# Patient Record
Sex: FEMALE | URBAN - METROPOLITAN AREA
[De-identification: names, ages, dates, MRNs, and addresses within clinical notes are randomized per-mention and may not be internally consistent; named-entity substitution may affect disease eponyms.]

---

## 2019-12-06 ENCOUNTER — OFFICE VISIT (OUTPATIENT)
Dept: FAMILY MEDICINE CLINIC | Age: 39
End: 2019-12-06
Payer: MEDICAID

## 2019-12-06 VITALS
SYSTOLIC BLOOD PRESSURE: 134 MMHG | RESPIRATION RATE: 16 BRPM | HEART RATE: 86 BPM | HEIGHT: 65 IN | WEIGHT: 156 LBS | BODY MASS INDEX: 25.99 KG/M2 | DIASTOLIC BLOOD PRESSURE: 86 MMHG | TEMPERATURE: 97.5 F | OXYGEN SATURATION: 98 %

## 2019-12-06 DIAGNOSIS — G89.29 CHRONIC UPPER BACK PAIN: ICD-10-CM

## 2019-12-06 DIAGNOSIS — M54.9 CHRONIC UPPER BACK PAIN: ICD-10-CM

## 2019-12-06 DIAGNOSIS — Z00.00 WELL ADULT EXAM: Primary | ICD-10-CM

## 2019-12-06 PROCEDURE — G8482 FLU IMMUNIZE ORDER/ADMIN: HCPCS | Performed by: FAMILY MEDICINE

## 2019-12-06 PROCEDURE — 90715 TDAP VACCINE 7 YRS/> IM: CPT | Performed by: FAMILY MEDICINE

## 2019-12-06 PROCEDURE — 90686 IIV4 VACC NO PRSV 0.5 ML IM: CPT | Performed by: FAMILY MEDICINE

## 2019-12-06 PROCEDURE — 90471 IMMUNIZATION ADMIN: CPT | Performed by: FAMILY MEDICINE

## 2019-12-06 PROCEDURE — 99385 PREV VISIT NEW AGE 18-39: CPT | Performed by: FAMILY MEDICINE

## 2019-12-06 PROCEDURE — 90472 IMMUNIZATION ADMIN EACH ADD: CPT | Performed by: FAMILY MEDICINE

## 2019-12-06 RX ORDER — TIZANIDINE 2 MG/1
2 TABLET ORAL NIGHTLY PRN
Qty: 20 TABLET | Refills: 0 | Status: SHIPPED | OUTPATIENT
Start: 2019-12-06 | End: 2020-01-13

## 2019-12-06 ASSESSMENT — PATIENT HEALTH QUESTIONNAIRE - PHQ9
SUM OF ALL RESPONSES TO PHQ QUESTIONS 1-9: 0
SUM OF ALL RESPONSES TO PHQ QUESTIONS 1-9: 0
SUM OF ALL RESPONSES TO PHQ9 QUESTIONS 1 & 2: 0
2. FEELING DOWN, DEPRESSED OR HOPELESS: 0
1. LITTLE INTEREST OR PLEASURE IN DOING THINGS: 0

## 2019-12-06 ASSESSMENT — ENCOUNTER SYMPTOMS
SHORTNESS OF BREATH: 0
WHEEZING: 0
VOMITING: 0
ABDOMINAL PAIN: 0
RHINORRHEA: 0
CHEST TIGHTNESS: 0
EYE ITCHING: 0
NAUSEA: 0
EYE REDNESS: 0
BACK PAIN: 1
TROUBLE SWALLOWING: 0

## 2019-12-12 LAB
ANION GAP SERPL CALCULATED.3IONS-SCNC: 12 MMOL/L (ref 3–16)
BASOPHILS ABSOLUTE: 0 K/UL (ref 0–0.2)
BASOPHILS RELATIVE PERCENT: 0.7 %
BUN BLDV-MCNC: 7 MG/DL (ref 7–20)
CALCIUM SERPL-MCNC: 10.8 MG/DL (ref 8.3–10.6)
CHLORIDE BLD-SCNC: 100 MMOL/L (ref 99–110)
CHOLESTEROL, TOTAL: 164 MG/DL (ref 0–199)
CO2: 24 MMOL/L (ref 21–32)
CREAT SERPL-MCNC: 0.6 MG/DL (ref 0.6–1.1)
EOSINOPHILS ABSOLUTE: 0 K/UL (ref 0–0.6)
EOSINOPHILS RELATIVE PERCENT: 1 %
GFR AFRICAN AMERICAN: >60
GFR NON-AFRICAN AMERICAN: >60
GLUCOSE BLD-MCNC: 88 MG/DL (ref 70–99)
HCT VFR BLD CALC: 30.7 % (ref 36–48)
HDLC SERPL-MCNC: 84 MG/DL (ref 40–60)
HEMOGLOBIN: 9.5 G/DL (ref 12–16)
LDL CHOLESTEROL CALCULATED: 72 MG/DL
LYMPHOCYTES ABSOLUTE: 1.4 K/UL (ref 1–5.1)
LYMPHOCYTES RELATIVE PERCENT: 35.7 %
MCH RBC QN AUTO: 24.1 PG (ref 26–34)
MCHC RBC AUTO-ENTMCNC: 31 G/DL (ref 31–36)
MCV RBC AUTO: 77.8 FL (ref 80–100)
MONOCYTES ABSOLUTE: 0.3 K/UL (ref 0–1.3)
MONOCYTES RELATIVE PERCENT: 7.2 %
NEUTROPHILS ABSOLUTE: 2.2 K/UL (ref 1.7–7.7)
NEUTROPHILS RELATIVE PERCENT: 55.4 %
PDW BLD-RTO: 16 % (ref 12.4–15.4)
PLATELET # BLD: 310 K/UL (ref 135–450)
PMV BLD AUTO: 8.8 FL (ref 5–10.5)
POTASSIUM SERPL-SCNC: 4.1 MMOL/L (ref 3.5–5.1)
RBC # BLD: 3.95 M/UL (ref 4–5.2)
SODIUM BLD-SCNC: 136 MMOL/L (ref 136–145)
TRIGL SERPL-MCNC: 42 MG/DL (ref 0–150)
VLDLC SERPL CALC-MCNC: 8 MG/DL
WBC # BLD: 4.1 K/UL (ref 4–11)

## 2019-12-13 LAB
HIV AG/AB: NORMAL
HIV ANTIGEN: NORMAL
HIV-1 ANTIBODY: NORMAL
HIV-2 AB: NORMAL

## 2020-01-10 ENCOUNTER — OFFICE VISIT (OUTPATIENT)
Dept: FAMILY MEDICINE CLINIC | Age: 40
End: 2020-01-10
Payer: MEDICAID

## 2020-01-10 VITALS
OXYGEN SATURATION: 100 % | RESPIRATION RATE: 16 BRPM | BODY MASS INDEX: 24.86 KG/M2 | DIASTOLIC BLOOD PRESSURE: 84 MMHG | TEMPERATURE: 97.6 F | WEIGHT: 154.7 LBS | HEART RATE: 79 BPM | HEIGHT: 66 IN | SYSTOLIC BLOOD PRESSURE: 122 MMHG

## 2020-01-10 PROCEDURE — G8482 FLU IMMUNIZE ORDER/ADMIN: HCPCS | Performed by: FAMILY MEDICINE

## 2020-01-10 PROCEDURE — 99395 PREV VISIT EST AGE 18-39: CPT | Performed by: FAMILY MEDICINE

## 2020-01-10 ASSESSMENT — ENCOUNTER SYMPTOMS
SORE THROAT: 0
DIARRHEA: 0
ABDOMINAL PAIN: 0
BACK PAIN: 0
NAUSEA: 0
VOMITING: 0
CONSTIPATION: 0

## 2020-01-10 ASSESSMENT — PATIENT HEALTH QUESTIONNAIRE - PHQ9
SUM OF ALL RESPONSES TO PHQ9 QUESTIONS 1 & 2: 0
2. FEELING DOWN, DEPRESSED OR HOPELESS: 0
SUM OF ALL RESPONSES TO PHQ QUESTIONS 1-9: 0
1. LITTLE INTEREST OR PLEASURE IN DOING THINGS: 0
SUM OF ALL RESPONSES TO PHQ QUESTIONS 1-9: 0

## 2020-01-10 NOTE — PROGRESS NOTES
Subjective:      Patient ID: Cale Greenwood is a 44 y.o. female. Gynecologic Exam   Primary symptoms comment: here for pap. The patient is experiencing no pain. Pertinent negatives include no abdominal pain, anorexia, back pain, chills, constipation, diarrhea, discolored urine, dysuria, fever, flank pain, frequency, headaches, hematuria, joint pain, joint swelling, nausea, painful intercourse, rash, sore throat, urgency or vomiting. She is sexually active. She uses nothing for contraception. There is no history of an STD or vaginosis. Review of Systems   Constitutional: Negative for chills and fever. HENT: Negative for sore throat. Gastrointestinal: Negative for abdominal pain, anorexia, constipation, diarrhea, nausea and vomiting. Genitourinary: Negative for dysuria, flank pain, frequency, hematuria and urgency. Musculoskeletal: Negative for back pain and joint pain. Skin: Negative for rash. Neurological: Negative for weakness and headaches. Psychiatric/Behavioral: Negative for sleep disturbance. Objective:  Blood pressure 122/84, pulse 79, temperature 97.6 °F (36.4 °C), temperature source Tympanic, resp. rate 16, height 5' 5.5\" (1.664 m), weight 154 lb 11.2 oz (70.2 kg), last menstrual period 12/31/2019, SpO2 100 %, not currently breastfeeding. Physical Exam  Exam conducted with a chaperone present. Constitutional:       General: She is not in acute distress. Appearance: She is well-developed and normal weight. She is not diaphoretic. HENT:      Head: Normocephalic and atraumatic. Right Ear: External ear normal.      Left Ear: External ear normal.      Nose: Nose normal.      Mouth/Throat:      Pharynx: No oropharyngeal exudate. Eyes:      General: No scleral icterus. Right eye: No discharge. Left eye: No discharge. Conjunctiva/sclera: Conjunctivae normal.      Pupils: Pupils are equal, round, and reactive to light.    Neck: rash.   Neurological:      Cranial Nerves: No cranial nerve deficit. Motor: No abnormal muscle tone. Coordination: Coordination normal.      Deep Tendon Reflexes: Reflexes normal.   Psychiatric:         Behavior: Behavior normal.         Thought Content: Thought content normal.         Judgment: Judgment normal.         Assessment:       Diagnosis Orders   1. Encounter for gynecological examination without abnormal finding  PAP SMEAR   2. Screening for breast cancer  GIGI DIGITAL SCREEN W CAD BILATERAL   3.  Chronic upper back pain  [facility] Physical [facility]           Plan:      Pap smear  Vaccines utd  Mammogram    Referral re printed    Fu prn     encourage contraception declines   Start PNV  patient agrees and verbalizes understanding    Fu prn        Mayte Alcantara MD

## 2020-01-13 LAB
HPV COMMENT: NORMAL
HPV TYPE 16: NOT DETECTED
HPV TYPE 18: NOT DETECTED
HPVOH (OTHER TYPES): NOT DETECTED

## 2020-01-13 RX ORDER — TIZANIDINE 2 MG/1
2 TABLET ORAL NIGHTLY PRN
Qty: 20 TABLET | Refills: 0 | Status: SHIPPED | OUTPATIENT
Start: 2020-01-13 | End: 2020-02-02

## 2020-01-28 ENCOUNTER — HOSPITAL ENCOUNTER (OUTPATIENT)
Dept: PHYSICAL THERAPY | Age: 40
Setting detail: THERAPIES SERIES
Discharge: HOME | End: 2020-01-28
Payer: MEDICAID

## 2020-01-28 PROCEDURE — 97110 THERAPEUTIC EXERCISES: CPT

## 2020-01-28 PROCEDURE — 97161 PT EVAL LOW COMPLEX 20 MIN: CPT
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
## 2020-01-28 NOTE — FLOWSHEET NOTE
Re-ed ([id number])                                                 Manual Intervention (08591)                                                     Pt. Education:  1/28/20 Pt was educated on PT POC, Diagnosis, Prognosis, pathomechanics as well as frequency and duration of scheduling future physical therapy appointments. Time was also taken on this day to answer all patient questions and participation in PT. Extensive discussion with pt on postural control and involvement with pain. Discussed goals of PT to improve postural control with flexibility of cervical spine as well as pec musculature and increased control and strength for interscapular musculature. HEP instruction:  1/28/20 Patient was educated on home exercise program including distrubution of handout describing exercises, sets, repetitions, frequency and intensity. Exercises/activities include: Upper trap stretch, doorway pec stretch, scap squeeze, mid row with lime band    Therapeutic Exercise and NMR EXR  [x] (17028) Provided verbal/tactile cueing for activities related to strengthening, flexibility, endurance, ROM for improvements in  [] LE / Lumbar: LE, proximal hip, and core control with self care, mobility, lifting, ambulation. [x] UE / Cervical: cervical, postural, scapular, scapulothoracic and UE control with self care, reaching, carrying, lifting, house/yardwork, driving, computer work.  [] (86424) Provided verbal/tactile cueing for activities related to improving balance, coordination, kinesthetic sense, posture, motor skill, proprioception to assist with   [] LE / lumbar: LE, proximal hip, and core control in self care, mobility, lifting, ambulation and eccentric single leg control.    [] UE / cervical: cervical, scapular, scapulothoracic and UE control with self care, reaching, carrying, lifting, house/yardwork, driving, computer work.   [] (13841) Therapist is in constant attendance of 2 or more patients providing skilled therapy interventions, but not providing any significant amount of measurable one-on-one time to either patient, for improvements in  [] LE / lumbar: LE, proximal hip, and core control in self care, mobility, lifting, ambulation and eccentric single leg control. [] UE / cervical: cervical, scapular, scapulothoracic and UE control with self care, reaching, carrying, lifting, house/yardwork, driving, computer work. NMR and Therapeutic Activities:    [x] (67481 or 64749) Provided verbal/tactile cueing for activities related to improving balance, coordination, kinesthetic sense, posture, motor skill, proprioception and motor activation to allow for proper function of   [] LE: / Lumbar core, proximal hip and LE with self care and ADLs  [x] UE / Cervical: cervical, postural, scapular, scapulothoracic and UE control with self care, carrying, lifting, driving, computer work.   [] (97827) Gait Re-education- Provided training and instruction to the patient for proper LE, core and proximal hip recruitment and positioning and eccentric body weight control with ambulation re-education including up and down stairs     Home Management Training / Self Care:  [] (65504) Provided self-care/home management training related to activities of daily living and compensatory training, and/or use of adaptive equipment for improvement with: ADLs and compensatory training, meal preparation, safety procedures and instruction in use of adaptive equipment, including bathing, grooming, dressing, personal hygiene, basic household cleaning and chores.      Home Exercise Program:    [x] (74650) Reviewed/Progressed HEP activities related to strengthening, flexibility, endurance, ROM of   [] LE / Lumbar: core, proximal hip and LE for functional self-care, mobility, lifting and ambulation/stair navigation   [x] UE / Cervical: cervical, postural, scapular, scapulothoracic and UE control with self care, reaching, carrying, lifting, house/yardwork, driving, computer her children   [] Progressing: [] Met: [] Not Met: [] Adjusted    Therapist goals for Patient:   Short Term Goals: To be achieved in: 2 weeks  1. Independent in HEP and progression per patient tolerance, in order to prevent re-injury. [] Progressing: [] Met: [] Not Met: [] Adjusted  2. Patient will have a decrease in pain to facilitate improvement in movement, function, and ADLs as indicated by Functional Deficits. [] Progressing: [] Met: [] Not Met: [] Adjusted    Long Term Goals: To be achieved in: 6 weeks  1. Disability index score of 8% or less for the NDI to assist with reaching prior level of function. [] Progressing: [] Met: [] Not Met: [] Adjusted  2. Patient will demonstrate increased AROM to [facility] of cervical/thoracic spine to allow for proper joint functioning as indicated by patients Functional Deficits. [] Progressing: [] Met: [] Not Met: [] Adjusted  3. Patient will demonstrate an increase in postural awareness and control and activation of  Deep cervical stabilizers to allow for proper functional mobility as indicated by patients Functional Deficits. [] Progressing: [] Met: [] Not Met: [] Adjusted  4. Patient will return to functional activities including ADL's and IADL's for longer than 1 hour without increased symptoms or restriction. [] Progressing: [] Met: [] Not Met: [] Adjusted  5. Patient will be able to returning to caring for her children without issues or limitations. [] Progressing: [] Met: [] Not Met: [] Adjusted    Overall Progression Towards Functional goals/ Treatment Progress Update:  [] Patient is progressing as expected towards functional goals listed. [] Progression is slowed due to complexities/Impairments listed. [] Progression has been slowed due to co-morbidities.   [x] Plan just implemented, too soon to assess goals progression <30days   [] Goals require adjustment due to lack of progress  [] Patient is not progressing as expected and requires additional follow up with physician  [] Other    Persisting Functional Limitations/Impairments:  []Sleeping []Sitting               []Standing []Transfers        []Walking []Kneeling               []Stairs []Squatting / bending   [x]ADLs [x]Reaching  [x]Lifting  [x]Housework  [x]Driving []Job related tasks  []Sports/Recreation []Other:        ASSESSMENT:  See eval    Treatment/Activity Tolerance:  [x] Patient able to complete tx  [] Patient limited by fatigue  [] Patient limited by pain  [] Patient limited by other medical complications  [] Other:     Prognosis: [x] Good [] Fair  [] Poor    Patient Requires Follow-up: [x] Yes  [] No    Plan for next treatment session: Postural stability emphasis, work on interscapular engagement emphasis, decreased UT recruitment. PLAN: See eval. PT 2x / week for 4-6 weeks. [] Continue per plan of care [] Alter current plan (see comments)  [x] Plan of care initiated [] Hold pending MD visit [] Discharge    Electronically signed by: Donald Harley PT, DPT    Note: If patient does not return for scheduled/ recommended follow up visits, his note will serve as a discharge from care along with most recent update on progress.

## 2020-02-05 ENCOUNTER — HOSPITAL ENCOUNTER (OUTPATIENT)
Dept: PHYSICAL THERAPY | Age: 40
Setting detail: THERAPIES SERIES
Discharge: HOME | End: 2020-02-05
Payer: MEDICAID

## 2020-02-05 PROCEDURE — 97110 THERAPEUTIC EXERCISES: CPT

## 2020-02-05 PROCEDURE — 97140 MANUAL THERAPY 1/> REGIONS: CPT

## 2020-02-05 NOTE — FLOWSHEET NOTE
[facility] Physical Therapy  Phone: [phone]   Fax: [phone]    Physical Therapy Daily Treatment Note  Date:  2020    Patient Name:  Amena Quesada    :  1980  MRN: [id number]  Medical/Treatment Diagnosis Information:  Diagnosis: Upper back pain  Treatment Diagnosis: Decreased interscapular muscular strength, postural control and neuromuscular stability contributing to increased stress/tension throughout cervical and scapular musculature causing increase in pain with prolonged activity with ADL's, IADL's and recreationally. Insurance/Certification information:  PT Insurance Information: [facility] (Eval + 12 FUA and then precertification needed)  Physician Information:  Referring Practitioner: Faina Ashley MD  Plan of care signed (Y/N): []  Yes [x]  No     Date of Patient follow up with Physician: n/a     Progress Report: []  Yes  [x]  No     Date Range for reporting period:  Beginning   Ending-    Progress report due (10 Rx/or 30 days whichever is less):       Recertification due (POC duration/ or 90 days whichever is less): 3/10     Visit # Insurance Allowable Auth required? Date Range   2 13 [x]  Yes (After 13 visits)  []  No -     Latex Allergy:  [x]NO      []YES  Preferred Language for Healthcare:   [x]English       []other:    Functional Scale: NDI: 18%  Date assessed:    Pain level:  3/10     SUBJECTIVE:  Patient states her pain is about the same.   She has not tried any of her HEP when asked by PT.  OBJECTIVE: See eval      RESTRICTIONS/PRECAUTIONS: n/a    Exercises/Interventions:     Therapeutic Exercises (20889) Resistance / level Sets/sec Reps Notes   Upper trap stretch  20\" 2 R/L HEP   Doorway pec stretch  20\" 2 HEP   scap squeeze  2-3\" hold 10 HEP; MAX cueing needed for appropriate and consistent muscle activation   Mid row with band lime 1 10 HEP: cues for correct scapular engagement    Prone  Prone B GH ext 0#     Prone B Rows core control in self care, mobility, lifting, ambulation and eccentric single leg control. [] UE / cervical: cervical, scapular, scapulothoracic and UE control with self care, reaching, carrying, lifting, house/yardwork, driving, computer work.   [] (95538) Therapist is in constant attendance of 2 or more patients providing skilled therapy interventions, but not providing any significant amount of measurable one-on-one time to either patient, for improvements in  [] LE / lumbar: LE, proximal hip, and core control in self care, mobility, lifting, ambulation and eccentric single leg control. [] UE / cervical: cervical, scapular, scapulothoracic and UE control with self care, reaching, carrying, lifting, house/yardwork, driving, computer work. NMR and Therapeutic Activities:    [x] (21411 or 68953) Provided verbal/tactile cueing for activities related to improving balance, coordination, kinesthetic sense, posture, motor skill, proprioception and motor activation to allow for proper function of   [] LE: / Lumbar core, proximal hip and LE with self care and ADLs  [x] UE / Cervical: cervical, postural, scapular, scapulothoracic and UE control with self care, carrying, lifting, driving, computer work.   [] (03716) Gait Re-education- Provided training and instruction to the patient for proper LE, core and proximal hip recruitment and positioning and eccentric body weight control with ambulation re-education including up and down stairs     Home Management Training / Self Care:  [] (59224) Provided self-care/home management training related to activities of daily living and compensatory training, and/or use of adaptive equipment for improvement with: ADLs and compensatory training, meal preparation, safety procedures and instruction in use of adaptive equipment, including bathing, grooming, dressing, personal hygiene, basic household cleaning and chores.      Home Exercise Program:    [x] (33111) Reviewed/Progressed towards functional goals listed. [] Progression is slowed due to complexities/Impairments listed. [] Progression has been slowed due to co-morbidities. [x] Plan just implemented, too soon to assess goals progression <30days   [] Goals require adjustment due to lack of progress  [] Patient is not progressing as expected and requires additional follow up with physician  [] Other    Persisting Functional Limitations/Impairments:  []Sleeping []Sitting               []Standing []Transfers        []Walking []Kneeling               []Stairs []Squatting / bending   [x]ADLs [x]Reaching  [x]Lifting  [x]Housework  [x]Driving []Job related tasks  []Sports/Recreation []Other:        ASSESSMENT:  Patient with low tolerance for manual PT for stiffness in thoracic spine. Patient has significant decreased postural awareness for upright upper quarter posture and neutral head position and required nearly constant tactile and verbal cues to correct. Continue to add in scapular and upper quarter postural exercises to progress patient. Treatment/Activity Tolerance:  [x] Patient able to complete tx  [] Patient limited by fatigue  [] Patient limited by pain  [] Patient limited by other medical complications  [] Other:     Prognosis: [x] Good [] Fair  [] Poor    Patient Requires Follow-up: [x] Yes  [] No    Plan for next treatment session: Postural stability emphasis, work on interscapular engagement emphasis, decreased UT recruitment. PLAN: See eval. PT 2x / week for 4-6 weeks. [] Continue per plan of care [] Alter current plan (see comments)  [] Plan of care initiated [] Hold pending MD visit [] Discharge    Electronically signed by: Chadwick Edwards PT, MSPT    Note: If patient does not return for scheduled/ recommended follow up visits, his note will serve as a discharge from care along with most recent update on progress.

## 2020-02-07 ENCOUNTER — HOSPITAL ENCOUNTER (OUTPATIENT)
Dept: PHYSICAL THERAPY | Age: 40
Setting detail: THERAPIES SERIES
Discharge: HOME | End: 2020-02-07
Payer: MEDICAID

## 2020-02-07 PROCEDURE — 97140 MANUAL THERAPY 1/> REGIONS: CPT

## 2020-02-07 PROCEDURE — 97110 THERAPEUTIC EXERCISES: CPT

## 2020-02-07 PROCEDURE — 97112 NEUROMUSCULAR REEDUCATION: CPT

## 2020-02-07 NOTE — FLOWSHEET NOTE
roll  1 10 Added 2/7 cues for technique and to keep arms in plane of body   Hook lying thoracic ext mobs with towel roll perpendicular to spine  2 5 Added 2/7 1 set upper thoracic and 1 set mid thoracic spine          Therapeutic Activities (01191)                                          Neuromuscular Re-ed (77646)       wALL Cjin tucks     Wall with Chin tucks with:    B shoulder ext with band and scap squeeze Blue 2 10 Added 2/7 cues for scapular position and technique   B shoulder ER with scap squeeze  Lime  2 10 Added 2/7 cues for scapular activation                  Manual Intervention (71900)       PA mobs B TP at T5-3, R/L TP at T3-C6, inferior glides of first rib emphasis on R Grade 2-3  4 min    Cervical distraction  20\" 4    Upper trap stretching with scapular fixation  20\" 2 R/L                             Pt. Education:  1/28/20 Pt was educated on PT POC, Diagnosis, Prognosis, pathomechanics as well as frequency and duration of scheduling future physical therapy appointments. Time was also taken on this day to answer all patient questions and participation in PT. Extensive discussion with pt on postural control and involvement with pain. Discussed goals of PT to improve postural control with flexibility of cervical spine as well as pec musculature and increased control and strength for interscapular musculature. HEP instruction:  1/28/20 Patient was educated on home exercise program including distrubution of handout describing exercises, sets, repetitions, frequency and intensity. Exercises/activities include: Upper trap stretch, doorway pec stretch, scap squeeze, mid row with lime band    Therapeutic Exercise and NMR EXR  [x] (27363) Provided verbal/tactile cueing for activities related to strengthening, flexibility, endurance, ROM for improvements in  [] LE / Lumbar: LE, proximal hip, and core control with self care, mobility, lifting, ambulation.   [x] UE / Cervical: cervical, postural, scapular, training, and/or use of adaptive equipment for improvement with: ADLs and compensatory training, meal preparation, safety procedures and instruction in use of adaptive equipment, including bathing, grooming, dressing, personal hygiene, basic household cleaning and chores. Home Exercise Program:    [] (03552) Reviewed/Progressed HEP activities related to strengthening, flexibility, endurance, ROM of   [] LE / Lumbar: core, proximal hip and LE for functional self-care, mobility, lifting and ambulation/stair navigation   [] UE / Cervical: cervical, postural, scapular, scapulothoracic and UE control with self care, reaching, carrying, lifting, house/yardwork, driving, computer work  [] (40429)Reviewed/Progressed HEP activities related to improving balance, coordination, kinesthetic sense, posture, motor skill, proprioception of   [] LE: core, proximal hip and LE for self care, mobility, lifting, and ambulation/stair navigation    [] UE / Cervical: cervical, postural,  scapular, scapulothoracic and UE control with self care, reaching, carrying, lifting, house/yardwork, driving, computer work    Manual Treatments:  PROM / STM / Oscillations-Mobs:  G-I, II, III, IV (PA's, Inf., Post.)  [] (54029) Provided manual therapy to mobilize LE, proximal hip and/or LS spine soft tissue/joints for the purpose of modulating pain, promoting relaxation,  increasing ROM, reducing/eliminating soft tissue swelling/inflammation/restriction, improving soft tissue extensibility and allowing for proper ROM for normal function with   [] LE / lumbar: self care, mobility, lifting and ambulation. [] UE / Cervical: self care, reaching, carrying, lifting, house/yardwork, driving, computer work.      Modalities:  [] (94593) Vasopneumatic compression: Utilized vasopneumatic compression to decrease edema / swelling for the purpose of improving mobility and quad tone / recruitment which will allow for increased overall function including but not limited to self-care, transfers, ambulation, and ascending / descending stairs. Modalities:      Charges:  Timed Code Treatment Minutes: 41   Total Treatment Minutes: 41     [] EVAL - LOW (74961)   [] EVAL - MOD (03748)  [] EVAL - HIGH (60435)  [] RE-EVAL (54350)  [x] BN(99063) x 1    [] Ionto  [] NMR (71013) x       [] Vaso  [x] Manual (58147) x1     [] Ultrasound  [] TA x        [] Mech Traction (85311)  [] Aquatic Therapy x     [] ES (un) (51356):   [] Home Management Training x  [] ES(attended) (37944)   [] Dry Needling 1-2 muscles (33177):  [] Dry Needling 3+ muscles (686549  [] Group:      [] Other:     GOALS:  Patient stated goal: decrease pain with caring for her children   [] Progressing: [] Met: [] Not Met: [] Adjusted    Therapist goals for Patient:   Short Term Goals: To be achieved in: 2 weeks  1. Independent in HEP and progression per patient tolerance, in order to prevent re-injury. [] Progressing: [] Met: [] Not Met: [] Adjusted  2. Patient will have a decrease in pain to facilitate improvement in movement, function, and ADLs as indicated by Functional Deficits. [] Progressing: [] Met: [] Not Met: [] Adjusted    Long Term Goals: To be achieved in: 6 weeks  1. Disability index score of 8% or less for the NDI to assist with reaching prior level of function. [] Progressing: [] Met: [] Not Met: [] Adjusted  2. Patient will demonstrate increased AROM to [facility] of cervical/thoracic spine to allow for proper joint functioning as indicated by patients Functional Deficits. [] Progressing: [] Met: [] Not Met: [] Adjusted  3. Patient will demonstrate an increase in postural awareness and control and activation of  Deep cervical stabilizers to allow for proper functional mobility as indicated by patients Functional Deficits. [] Progressing: [] Met: [] Not Met: [] Adjusted  4.  Patient will return to functional activities including ADL's and IADL's for longer than 1 hour without increased symptoms or (see comments)  [] Plan of care initiated [] Hold pending MD visit [] Discharge    Electronically signed by: Jose Miguel Sommer PT    Note: If patient does not return for scheduled/ recommended follow up visits, his note will serve as a discharge from care along with most recent update on progress.

## 2020-02-10 ENCOUNTER — APPOINTMENT (OUTPATIENT)
Dept: PHYSICAL THERAPY | Age: 40
End: 2020-02-10
Payer: MEDICAID

## 2020-02-14 ENCOUNTER — HOSPITAL ENCOUNTER (OUTPATIENT)
Dept: PHYSICAL THERAPY | Age: 40
Setting detail: THERAPIES SERIES
Discharge: HOME | End: 2020-02-14
Payer: MEDICAID

## 2020-02-14 PROCEDURE — 97110 THERAPEUTIC EXERCISES: CPT

## 2020-02-14 PROCEDURE — 97112 NEUROMUSCULAR REEDUCATION: CPT

## 2020-02-14 NOTE — FLOWSHEET NOTE
Lime    Blue 1    2 10    10 HEP: cues for correct scapular engagement; ^2/7 resistance/sets   Prone  Added 2/5   UBE  60RPM  3 min    [facility] with double towel roll  1 10 Added 2/7 cues for technique and to keep arms in plane of body   Hook lying thoracic ext mobs with towel roll perpendicular to spine  2 5 Added 2/7 1 set upper thoracic and 1 set mid thoracic spine          Therapeutic Activities (68317)                                          Neuromuscular Re-ed (01879)       Chin Tucks at wall with towel  5\"10    Wall with Chin tucks with: B GH Flex to 90 degrees 0# 1     10      B shoulder ext with band and scap squeeze Blue 2 10 Added 2/7 cues for scapular position and technique   B shoulder ER with scap squeeze  Lime  2 10 Added 2/7 cues for scapular activation    Touch downs  1 10 Added 2/14 cues for technique for head positioning. Manual Intervention ([id number])       PA mobs B TP at T5-3, R/L TP at T3-C6, inferior glides of first rib emphasis on R    Cervical distraction    Upper trap stretching with scapular fixation                             Pt. Education:  1/28/20 Pt was educated on PT POC, Diagnosis, Prognosis, pathomechanics as well as frequency and duration of scheduling future physical therapy appointments. Time was also taken on this day to answer all patient questions and participation in PT. Extensive discussion with pt on postural control and involvement with pain. Discussed goals of PT to improve postural control with flexibility of cervical spine as well as pec musculature and increased control and strength for interscapular musculature. HEP instruction:  1/28/20 Patient was educated on home exercise program including distrubution of handout describing exercises, sets, repetitions, frequency and intensity.  Exercises/activities include: Upper trap stretch, doorway pec stretch, scap squeeze, mid row with lime band    Therapeutic Exercise and NMR EXR  [x] (05221) Provided positioning and eccentric body weight control with ambulation re-education including up and down stairs     Home Management Training / Self Care:  [] (88621) Provided self-care/home management training related to activities of daily living and compensatory training, and/or use of adaptive equipment for improvement with: ADLs and compensatory training, meal preparation, safety procedures and instruction in use of adaptive equipment, including bathing, grooming, dressing, personal hygiene, basic household cleaning and chores. Home Exercise Program:    [] (05031) Reviewed/Progressed HEP activities related to strengthening, flexibility, endurance, ROM of   [] LE / Lumbar: core, proximal hip and LE for functional self-care, mobility, lifting and ambulation/stair navigation   [] UE / Cervical: cervical, postural, scapular, scapulothoracic and UE control with self care, reaching, carrying, lifting, house/yardwork, driving, computer work  [] (16217)Reviewed/Progressed HEP activities related to improving balance, coordination, kinesthetic sense, posture, motor skill, proprioception of   [] LE: core, proximal hip and LE for self care, mobility, lifting, and ambulation/stair navigation    [] UE / Cervical: cervical, postural,  scapular, scapulothoracic and UE control with self care, reaching, carrying, lifting, house/yardwork, driving, computer work    Manual Treatments:  PROM / STM / Oscillations-Mobs:  G-I, II, III, IV (PA's, Inf., Post.)  [] (82277) Provided manual therapy to mobilize LE, proximal hip and/or LS spine soft tissue/joints for the purpose of modulating pain, promoting relaxation,  increasing ROM, reducing/eliminating soft tissue swelling/inflammation/restriction, improving soft tissue extensibility and allowing for proper ROM for normal function with   [] LE / lumbar: self care, mobility, lifting and ambulation.     [] UE / Cervical: self care, reaching, carrying, lifting, house/yardwork, driving, computer control and activation of  Deep cervical stabilizers to allow for proper functional mobility as indicated by patients Functional Deficits. [] Progressing: [] Met: [] Not Met: [] Adjusted  4. Patient will return to functional activities including ADL's and IADL's for longer than 1 hour without increased symptoms or restriction. [] Progressing: [] Met: [] Not Met: [] Adjusted  5. Patient will be able to returning to caring for her children without issues or limitations. [] Progressing: [] Met: [] Not Met: [] Adjusted    Overall Progression Towards Functional goals/ Treatment Progress Update:  [x] Patient is progressing as expected towards functional goals listed. [] Progression is slowed due to complexities/Impairments listed. [] Progression has been slowed due to co-morbidities. [] Plan just implemented, too soon to assess goals progression <30days   [] Goals require adjustment due to lack of progress  [] Patient is not progressing as expected and requires additional follow up with physician  [] Other    Persisting Functional Limitations/Impairments:  []Sleeping []Sitting               []Standing []Transfers        []Walking []Kneeling               []Stairs []Squatting / bending   [x]ADLs [x]Reaching  [x]Lifting  [x]Housework  [x]Driving []Job related tasks  []Sports/Recreation []Other:        ASSESSMENT:  Pt overall doing better, progressing with decreased reports of pain and frequency of pain. Very low tolerance to therex today limiting progressions with session. Pt reports of increase in fatigue and soreness with interventions, decreased with stretching and MHP after. Will benefit from continuation of PT to work on progressing postural awareness and control as able.      Treatment/Activity Tolerance:  [x] Patient able to complete tx  [] Patient limited by fatigue  [] Patient limited by pain  [] Patient limited by other medical complications  [] Other:     Prognosis: [x] Good [] Fair  []

## 2020-02-17 ENCOUNTER — HOSPITAL ENCOUNTER (OUTPATIENT)
Dept: PHYSICAL THERAPY | Age: 40
Setting detail: THERAPIES SERIES
Discharge: HOME | End: 2020-02-17
Payer: MEDICAID

## 2020-02-17 PROCEDURE — 97112 NEUROMUSCULAR REEDUCATION: CPT

## 2020-02-17 PROCEDURE — 97110 THERAPEUTIC EXERCISES: CPT

## 2020-02-17 NOTE — FLOWSHEET NOTE
work.   [] (21711) Gait Re-education- Provided training and instruction to the patient for proper LE, core and proximal hip recruitment and positioning and eccentric body weight control with ambulation re-education including up and down stairs     Home Management Training / Self Care:  [] (53503) Provided self-care/home management training related to activities of daily living and compensatory training, and/or use of adaptive equipment for improvement with: ADLs and compensatory training, meal preparation, safety procedures and instruction in use of adaptive equipment, including bathing, grooming, dressing, personal hygiene, basic household cleaning and chores.      Home Exercise Program:    [] (37734) Reviewed/Progressed HEP activities related to strengthening, flexibility, endurance, ROM of   [] LE / Lumbar: core, proximal hip and LE for functional self-care, mobility, lifting and ambulation/stair navigation   [] UE / Cervical: cervical, postural, scapular, scapulothoracic and UE control with self care, reaching, carrying, lifting, house/yardwork, driving, computer work  [] (71488)Reviewed/Progressed HEP activities related to improving balance, coordination, kinesthetic sense, posture, motor skill, proprioception of   [] LE: core, proximal hip and LE for self care, mobility, lifting, and ambulation/stair navigation    [] UE / Cervical: cervical, postural,  scapular, scapulothoracic and UE control with self care, reaching, carrying, lifting, house/yardwork, driving, computer work    Manual Treatments:  PROM / STM / Oscillations-Mobs:  G-I, II, III, IV (PA's, Inf., Post.)  [] (02836) Provided manual therapy to mobilize LE, proximal hip and/or LS spine soft tissue/joints for the purpose of modulating pain, promoting relaxation,  increasing ROM, reducing/eliminating soft tissue swelling/inflammation/restriction, improving soft tissue extensibility and allowing for proper ROM for normal function with   [] LE / lumbar: patients Functional Deficits. [] Progressing: [] Met: [] Not Met: [] Adjusted  3. Patient will demonstrate an increase in postural awareness and control and activation of  Deep cervical stabilizers to allow for proper functional mobility as indicated by patients Functional Deficits. [] Progressing: [] Met: [] Not Met: [] Adjusted  4. Patient will return to functional activities including ADL's and IADL's for longer than 1 hour without increased symptoms or restriction. [] Progressing: [] Met: [] Not Met: [] Adjusted  5. Patient will be able to returning to caring for her children without issues or limitations. [] Progressing: [] Met: [] Not Met: [] Adjusted    Overall Progression Towards Functional goals/ Treatment Progress Update:  [x] Patient is progressing as expected towards functional goals listed. [] Progression is slowed due to complexities/Impairments listed. [] Progression has been slowed due to co-morbidities. [] Plan just implemented, too soon to assess goals progression <30days   [] Goals require adjustment due to lack of progress  [] Patient is not progressing as expected and requires additional follow up with physician  [] Other    Persisting Functional Limitations/Impairments:  []Sleeping []Sitting               []Standing []Transfers        []Walking []Kneeling               []Stairs []Squatting / bending   [x]ADLs [x]Reaching  [x]Lifting  [x]Housework  [x]Driving []Job related tasks  []Sports/Recreation []Other:        ASSESSMENT:  Modifications with session per log due to increase in soreness after last visit however with increased pain, pain was short lived and decreased by next day.  Symptoms currently presenting as lack in functional activity tolerance with increase in pain with prolonged activity and decreased with rest. Progressions in session today limited due to reports of upper back tightness, overall progressing towards goals and will benefit from continuation to work on

## 2020-02-20 ENCOUNTER — HOSPITAL ENCOUNTER (OUTPATIENT)
Dept: PHYSICAL THERAPY | Age: 40
Setting detail: THERAPIES SERIES
Discharge: HOME | End: 2020-02-20
Payer: MEDICAID

## 2020-02-20 PROCEDURE — 97112 NEUROMUSCULAR REEDUCATION: CPT

## 2020-02-20 PROCEDURE — 97110 THERAPEUTIC EXERCISES: CPT

## 2020-02-20 NOTE — FLOWSHEET NOTE
[address] [facility] Physical Therapy  Phone: [phone]   Fax: [phone]    Physical Therapy Daily Treatment Note  Date:  2020    Patient Name:  Mannie Lizama    :  1980  MRN: [id number]  Medical/Treatment Diagnosis Information:  Diagnosis: Upper back pain  Treatment Diagnosis: Decreased interscapular muscular strength, postural control and neuromuscular stability contributing to increased stress/tension throughout cervical and scapular musculature causing increase in pain with prolonged activity with ADL's, IADL's and recreationally. Insurance/Certification information:  PT Insurance Information: [employer] (Eval + 12 FUA and then precertification needed)  Physician Information:  Referring Practitioner: Miguel Porter MD  Plan of care signed (Y/N): [x]  Yes []  No     Date of Patient follow up with Physician: n/a     Progress Report: []  Yes  [x]  No     Date Range for reporting period:  Beginning   Ending-    Progress report due (10 Rx/or 30 days whichever is less): 3/05      Recertification due (POC duration/ or 90 days whichever is less): 3/10     Visit # Insurance Allowable Auth required? Date Range   6 13 [x]  Yes (After 13 visits)  []  No -     Latex Allergy:  [x]NO      []YES  Preferred Language for Healthcare:   [x]English       []other:    Functional Scale: NDI: 18%  Date assessed:    Pain level:  0/10     SUBJECTIVE:  Pt reports after last visit her pain was not nearly as bad and only mild soreness after session. Pain since has been ok as well. Still modified activity to prevent pain.      OBJECTIVE:       RESTRICTIONS/PRECAUTIONS: n/a    Exercises/Interventions:     Therapeutic Exercises (14663) Resistance / level Sets/sec Reps Notes   Upper trap stretch  20\" 2 R/L HEP   Doorway pec stretch  20\" 2 HEP   scap squeeze  Mid row with band     Blue     2     10 HEP: cues for correct scapular engagement; ^2/7 resistance/sets   Prone  Added 2/5   UBE stretch, scap squeeze, mid row with lime band    Therapeutic Exercise and NMR EXR  [x] (37530) Provided verbal/tactile cueing for activities related to strengthening, flexibility, endurance, ROM for improvements in  [] LE / Lumbar: LE, proximal hip, and core control with self care, mobility, lifting, ambulation. [x] UE / Cervical: cervical, postural, scapular, scapulothoracic and UE control with self care, reaching, carrying, lifting, house/yardwork, driving, computer work.  [] (57679) Provided verbal/tactile cueing for activities related to improving balance, coordination, kinesthetic sense, posture, motor skill, proprioception to assist with   [] LE / lumbar: LE, proximal hip, and core control in self care, mobility, lifting, ambulation and eccentric single leg control. [] UE / cervical: cervical, scapular, scapulothoracic and UE control with self care, reaching, carrying, lifting, house/yardwork, driving, computer work.   [] (95734) Therapist is in constant attendance of 2 or more patients providing skilled therapy interventions, but not providing any significant amount of measurable one-on-one time to either patient, for improvements in  [] LE / lumbar: LE, proximal hip, and core control in self care, mobility, lifting, ambulation and eccentric single leg control. [] UE / cervical: cervical, scapular, scapulothoracic and UE control with self care, reaching, carrying, lifting, house/yardwork, driving, computer work.      NMR and Therapeutic Activities:    [x] (78575 or 82958) Provided verbal/tactile cueing for activities related to improving balance, coordination, kinesthetic sense, posture, motor skill, proprioception and motor activation to allow for proper function of   [] LE: / Lumbar core, proximal hip and LE with self care and ADLs  [x] UE / Cervical: cervical, postural, scapular, scapulothoracic and UE control with self care, carrying, lifting, driving, computer work.   [] (31990) Gait Re-education- ambulation. [] UE / Cervical: self care, reaching, carrying, lifting, house/yardwork, driving, computer work. Modalities:  [] (22737) Vasopneumatic compression: Utilized vasopneumatic compression to decrease edema / swelling for the purpose of improving mobility and quad tone / recruitment which will allow for increased overall function including but not limited to self-care, transfers, ambulation, and ascending / descending stairs. Modalities:  2/20, 2/17, 2/14: MHP to cervical paraspinals x 10 mins after session    Charges:  Timed Code Treatment Minutes: 38   Total Treatment Minutes: 49     [] EVAL - LOW (89186)   [] EVAL - MOD (69296)  [] EVAL - HIGH (12561)  [] RE-EVAL (22780)  [x] LH(89736) x 2    [] Ionto  [x] NMR (34676) x   1    [] Vaso  [] Manual (83059) x       [] Ultrasound  [] TA x        [] Mech Traction (69834)  [] Aquatic Therapy x      [] ES (un) (81353):   [] Home Management Training x  [] ES(attended) (88615)   [] Dry Needling 1-2 muscles (17065):  [] Dry Needling 3+ muscles (148821  [] Group:      [] Other:     GOALS:  Patient stated goal: decrease pain with caring for her children   [] Progressing: [] Met: [] Not Met: [] Adjusted    Therapist goals for Patient:   Short Term Goals: To be achieved in: 2 weeks  1. Independent in HEP and progression per patient tolerance, in order to prevent re-injury. [] Progressing: [] Met: [] Not Met: [] Adjusted  2. Patient will have a decrease in pain to facilitate improvement in movement, function, and ADLs as indicated by Functional Deficits. [] Progressing: [] Met: [] Not Met: [] Adjusted    Long Term Goals: To be achieved in: 6 weeks  1. Disability index score of 8% or less for the NDI to assist with reaching prior level of function. [] Progressing: [] Met: [] Not Met: [] Adjusted  2. Patient will demonstrate increased AROM to [facility] of cervical/thoracic spine to allow for proper joint functioning as indicated by patients Functional Deficits. [] Progressing: [] Met: [] Not Met: [] Adjusted  3. Patient will demonstrate an increase in postural awareness and control and activation of  Deep cervical stabilizers to allow for proper functional mobility as indicated by patients Functional Deficits. [] Progressing: [] Met: [] Not Met: [] Adjusted  4. Patient will return to functional activities including ADL's and IADL's for longer than 1 hour without increased symptoms or restriction. [] Progressing: [] Met: [] Not Met: [] Adjusted  5. Patient will be able to returning to caring for her children without issues or limitations. [] Progressing: [] Met: [] Not Met: [] Adjusted    Overall Progression Towards Functional goals/ Treatment Progress Update:  [x] Patient is progressing as expected towards functional goals listed. [] Progression is slowed due to complexities/Impairments listed. [] Progression has been slowed due to co-morbidities. [] Plan just implemented, too soon to assess goals progression <30days   [] Goals require adjustment due to lack of progress  [] Patient is not progressing as expected and requires additional follow up with physician  [] Other    Persisting Functional Limitations/Impairments:  []Sleeping []Sitting               []Standing []Transfers        []Walking []Kneeling               []Stairs []Squatting / bending   [x]ADLs [x]Reaching  [x]Lifting  [x]Housework  [x]Driving []Job related tasks  []Sports/Recreation []Other:        ASSESSMENT:  Minimal progressions today as pt continues to fatigue very quickly with increased reports of soreness and pain neck/shoulder blades with interventions. Continues with good carryover with minimal reports of pain outside of PT however has been modifying her activity as to not expose herself to times when her pain might be increased. Will benefit from continuation of PT to work on progressing activity tolerance and strength as able.      Treatment/Activity Tolerance:  [] Patient able to

## 2020-02-24 ENCOUNTER — HOSPITAL ENCOUNTER (OUTPATIENT)
Dept: PHYSICAL THERAPY | Age: 40
Setting detail: THERAPIES SERIES
Discharge: HOME | End: 2020-02-24
Payer: MEDICAID

## 2020-02-24 PROCEDURE — 97110 THERAPEUTIC EXERCISES: CPT

## 2020-02-24 NOTE — FLOWSHEET NOTE
Reps Notes   Upper trap stretch  20\" 2 R/L HEP   Doorway pec stretch  20\" 2 HEP   scap squeeze  Mid row with band     Blue     2     10 HEP: cues for correct scapular engagement; ^2/7 resistance/sets   Prone  Added 2/5   UBE  60RPM  3 min    [facility] with double towel roll  Added 2/7 cues for technique and to keep arms in plane of body   Hook lying thoracic ext mobs with towel roll perpendicular to spine  Added 2/7 1 set upper thoracic and 1 set mid thoracic spine          Therapeutic Activities (26805)                                          Neuromuscular Re-ed (63710)       Chin Tucks at wall with towel  5\"15 ^2/17 reps   Wall with Chin tucks with: B GH Flex to 90 degrees 0# 1     15   ^2/17 reps   B shoulder ext with band and scap squeeze Blue 2 10 Added 2/7 cues for scapular position and technique   B shoulder ER with scap squeeze  Orange 2 10 Added 2/7 cues for scapular activation; 2/17 Decreased resistance due to soreness    Added 2/14 cues for technique for head positioning. D2 PNF flexion  [place]  1 10 Added 2/17 cues for technique, emphasis on diagonals    Side stepping at wall with band on wrists orange 15 feet 1 R/L Added 2/17   Ball on wall (up/down, side to side) 1# 1 10 Added 2/24   Wall slide with band on wrists orange 1 10 Added 2/24 cues to keep wrists apart                        Manual Intervention (17256)       PA mobs B TP at T5-3, R/L TP at T3-C6, inferior glides of first rib emphasis on R    Cervical distraction    Upper trap stretching with scapular fixation                             Pt. Education:  1/28/20 Pt was educated on PT POC, Diagnosis, Prognosis, pathomechanics as well as frequency and duration of scheduling future physical therapy appointments. Time was also taken on this day to answer all patient questions and participation in PT. Extensive discussion with pt on postural control and involvement with pain.  Discussed goals of PT to improve postural control with flexibility kinesthetic sense, posture, motor skill, proprioception and motor activation to allow for proper function of   [] LE: / Lumbar core, proximal hip and LE with self care and ADLs  [x] UE / Cervical: cervical, postural, scapular, scapulothoracic and UE control with self care, carrying, lifting, driving, computer work.   [] (02341) Gait Re-education- Provided training and instruction to the patient for proper LE, core and proximal hip recruitment and positioning and eccentric body weight control with ambulation re-education including up and down stairs     Home Management Training / Self Care:  [] (00086) Provided self-care/home management training related to activities of daily living and compensatory training, and/or use of adaptive equipment for improvement with: ADLs and compensatory training, meal preparation, safety procedures and instruction in use of adaptive equipment, including bathing, grooming, dressing, personal hygiene, basic household cleaning and chores.      Home Exercise Program:    [] (33167) Reviewed/Progressed HEP activities related to strengthening, flexibility, endurance, ROM of   [] LE / Lumbar: core, proximal hip and LE for functional self-care, mobility, lifting and ambulation/stair navigation   [] UE / Cervical: cervical, postural, scapular, scapulothoracic and UE control with self care, reaching, carrying, lifting, house/yardwork, driving, computer work  [] (49018)Reviewed/Progressed HEP activities related to improving balance, coordination, kinesthetic sense, posture, motor skill, proprioception of   [] LE: core, proximal hip and LE for self care, mobility, lifting, and ambulation/stair navigation    [] UE / Cervical: cervical, postural,  scapular, scapulothoracic and UE control with self care, reaching, carrying, lifting, house/yardwork, driving, computer work    Manual Treatments:  PROM / STM / Oscillations-Mobs:  G-I, II, III, IV (PA's, Inf., Post.)  [] (28291) Provided manual therapy to mobilize LE, proximal hip and/or LS spine soft tissue/joints for the purpose of modulating pain, promoting relaxation,  increasing ROM, reducing/eliminating soft tissue swelling/inflammation/restriction, improving soft tissue extensibility and allowing for proper ROM for normal function with   [] LE / lumbar: self care, mobility, lifting and ambulation. [] UE / Cervical: self care, reaching, carrying, lifting, house/yardwork, driving, computer work. Modalities:  [] (56645) Vasopneumatic compression: Utilized vasopneumatic compression to decrease edema / swelling for the purpose of improving mobility and quad tone / recruitment which will allow for increased overall function including but not limited to self-care, transfers, ambulation, and ascending / descending stairs. Modalities:  2/24, 2/20, 2/17, 2/14: MHP to cervical paraspinals x 10 mins after session    Charges:  Timed Code Treatment Minutes: 33   Total Treatment Minutes: 45     [] EVAL - LOW (06512)   [] EVAL - MOD (70956)  [] EVAL - HIGH (62750)  [] RE-EVAL (11122)  [x] MO(08238) x 2    [] Ionto  [] NMR (58109) x       [] Vaso  [] Manual (49280) x       [] Ultrasound  [] TA x        [] Mech Traction (62434)  [] Aquatic Therapy x      [] ES (un) (76120):   [] Home Management Training x  [] ES(attended) (05869)   [] Dry Needling 1-2 muscles (59788):  [] Dry Needling 3+ muscles (849438  [] Group:      [] Other:     GOALS:  Patient stated goal: decrease pain with caring for her children   [] Progressing: [] Met: [] Not Met: [] Adjusted    Therapist goals for Patient:   Short Term Goals: To be achieved in: 2 weeks  1. Independent in HEP and progression per patient tolerance, in order to prevent re-injury. [] Progressing: [] Met: [] Not Met: [] Adjusted  2. Patient will have a decrease in pain to facilitate improvement in movement, function, and ADLs as indicated by Functional Deficits.   [] Progressing: [] Met: [] Not Met: [] Adjusted    Long Term

## 2020-02-27 ENCOUNTER — HOSPITAL ENCOUNTER (OUTPATIENT)
Dept: PHYSICAL THERAPY | Age: 40
Setting detail: THERAPIES SERIES
Discharge: HOME | End: 2020-02-27
Payer: MEDICAID

## 2020-02-27 PROCEDURE — 97110 THERAPEUTIC EXERCISES: CPT

## 2020-02-27 NOTE — FLOWSHEET NOTE
strength for interscapular musculature. HEP instruction:  1/28/20 Patient was educated on home exercise program including distrubution of handout describing exercises, sets, repetitions, frequency and intensity. Exercises/activities include: Upper trap stretch, doorway pec stretch, scap squeeze, mid row with lime band    Therapeutic Exercise and NMR EXR  [x] (17206) Provided verbal/tactile cueing for activities related to strengthening, flexibility, endurance, ROM for improvements in  [] LE / Lumbar: LE, proximal hip, and core control with self care, mobility, lifting, ambulation. [x] UE / Cervical: cervical, postural, scapular, scapulothoracic and UE control with self care, reaching, carrying, lifting, house/yardwork, driving, computer work.  [] (53320) Provided verbal/tactile cueing for activities related to improving balance, coordination, kinesthetic sense, posture, motor skill, proprioception to assist with   [] LE / lumbar: LE, proximal hip, and core control in self care, mobility, lifting, ambulation and eccentric single leg control. [] UE / cervical: cervical, scapular, scapulothoracic and UE control with self care, reaching, carrying, lifting, house/yardwork, driving, computer work.   [] (31191) Therapist is in constant attendance of 2 or more patients providing skilled therapy interventions, but not providing any significant amount of measurable one-on-one time to either patient, for improvements in  [] LE / lumbar: LE, proximal hip, and core control in self care, mobility, lifting, ambulation and eccentric single leg control. [] UE / cervical: cervical, scapular, scapulothoracic and UE control with self care, reaching, carrying, lifting, house/yardwork, driving, computer work.      NMR and Therapeutic Activities:    [x] (51409 or 05122) Provided verbal/tactile cueing for activities related to improving balance, coordination, kinesthetic sense, posture, motor skill, proprioception and motor weights limiting progressions. Slowly progressing with activity tolerance however pt continues to be significantly limited due to reports of pain and fatigue with interscapular muscle progressions. Treatment/Activity Tolerance:  [] Patient able to complete tx  [x] Patient limited by fatigue  [] Patient limited by pain  [] Patient limited by other medical complications  [] Other:     Prognosis: [x] Good [] Fair  [] Poor    Patient Requires Follow-up: [x] Yes  [] No    Plan for next treatment session: Postural stability emphasis, work on interscapular engagement emphasis, decreased UT recruitment. PLAN: See eval. PT 2x / week for 4-6 weeks. [x] Continue per plan of care [] Alter current plan (see comments)  [] Plan of care initiated [] Hold pending MD visit [] Discharge    Electronically signed by: Nabila Blackwell PT    Note: If patient does not return for scheduled/ recommended follow up visits, his note will serve as a discharge from care along with most recent update on progress.

## 2020-03-02 ENCOUNTER — HOSPITAL ENCOUNTER (OUTPATIENT)
Dept: PHYSICAL THERAPY | Age: 40
Setting detail: THERAPIES SERIES
Discharge: HOME | End: 2020-03-02
Payer: MEDICAID

## 2020-03-02 PROCEDURE — 97530 THERAPEUTIC ACTIVITIES: CPT

## 2020-03-02 PROCEDURE — 97110 THERAPEUTIC EXERCISES: CPT

## 2020-03-02 NOTE — FLOWSHEET NOTE
experienced. Most activity at home has been significantly improved with pt subjective reporting as well as currently pt has met all but 1 goal and will put PT on hold currently. F/u prn. Date range of Visits:  - 3/2  Total Visits: 9    Recommendation:    []Continue PT -x / wk for - weeks. [x]Hold PT, f/u prn      Physical Therapy Daily Treatment Note  Date:  3/2/2020    Patient Name:  Ana Logan    :  1980  MRN: [id number]  Medical/Treatment Diagnosis Information:  Diagnosis: Upper back pain  Treatment Diagnosis: Decreased interscapular muscular strength, postural control and neuromuscular stability contributing to increased stress/tension throughout cervical and scapular musculature causing increase in pain with prolonged activity with ADL's, IADL's and recreationally. Insurance/Certification information:  PT Insurance Information: [facility] (Eval + 12 FUA and then precertification needed)  Physician Information:  Referring Practitioner: Charlene Neely MD  Plan of care signed (Y/N): [x]  Yes []  No     Date of Patient follow up with Physician: n/a     Progress Report: []  Yes  [x]  No     Date Range for reporting period:  Beginning   Ending-    Progress report due (10 Rx/or 30 days whichever is less):       Recertification due (POC duration/ or 90 days whichever is less): 3/10     Visit # Insurance Allowable Auth required? Date Range   8 13 [x]  Yes (After 13 visits)  []  No -     Latex Allergy:  [x]NO      []YES  Preferred Language for Healthcare:   [x]English       []other:    Functional Scale: NDI: 18%  Date assessed:    Pain level:  0/10     SUBJECTIVE:  Pt reports she feels things are getting better, pain is no longer consistent. Still does have some pain with repetitive activity like dishes or laundry but feels that it is much better than it has been.  Feels at this time she is comfortable with HEP and is much better than when she started and is ready for d/c to HEP at this time.      OBJECTIVE:       RESTRICTIONS/PRECAUTIONS: n/a    Exercises/Interventions:     Therapeutic Exercises (54127) Resistance / level Sets/sec Reps Notes   Upper trap stretch Doorway pec stretch scap squeeze Mid row with band Prone  UBE  60RPM  3 min    [employer] with double towel roll  Added 2/7 cues for technique and to keep arms in plane of body   Hook lying thoracic ext mobs with towel roll perpendicular to spine  Added 2/7 1 set upper thoracic and 1 set mid thoracic spine          Therapeutic Activities (07494)                                          Neuromuscular Re-ed (77553)       Chin Tucks at wall with towel  ^2/17 reps   Wall with Chin tucks with: ^2/17 reps, ^2/27 1 set with weights   B shoulder ext with band and scap squeeze Added 2/7 cues for scapular position and technique   B shoulder ER with scap squeeze  Added 2/7 cues for scapular activation; 2/17 Decreased resistance due to soreness    Added 2/14 cues for technique for head positioning. D2 PNF flexion  Added 2/17 cues for technique, emphasis on diagonals    Side stepping at wall with band on wrists Added 2/17   Ball on wall (up/down, side to side) Added 2/24   Wall slide with band on wrists Added 2/24 cues to keep wrists apart                        Manual Intervention (36084)       PA mobs B TP at T5-3, R/L TP at T3-C6, inferior glides of first rib emphasis on R    Cervical distraction    Upper trap stretching with scapular fixation                             Pt. Education:  1/28/20 Pt was educated on PT POC, Diagnosis, Prognosis, pathomechanics as well as frequency and duration of scheduling future physical therapy appointments. Time was also taken on this day to answer all patient questions and participation in PT. Extensive discussion with pt on postural control and involvement with pain.  Discussed goals of PT to improve postural control with flexibility of cervical spine as well as pec musculature and increased control and re-injury. [] Progressing: [x] Met: [] Not Met: [] Adjusted  2. Patient will have a decrease in pain to facilitate improvement in movement, function, and ADLs as indicated by Functional Deficits. [] Progressing: [x] Met: [] Not Met: [] Adjusted    Long Term Goals: To be achieved in: 6 weeks  1. Disability index score of 8% or less for the NDI to assist with reaching prior level of function. [] Progressing: [x] Met: [] Not Met: [] Adjusted  2. Patient will demonstrate increased AROM to [facility] of cervical/thoracic spine to allow for proper joint functioning as indicated by patients Functional Deficits. [] Progressing: [x] Met: [] Not Met: [] Adjusted  3. Patient will demonstrate an increase in postural awareness and control and activation of  Deep cervical stabilizers to allow for proper functional mobility as indicated by patients Functional Deficits. [] Progressing: [x] Met: [] Not Met: [] Adjusted  4. Patient will return to functional activities including ADL's and IADL's for longer than 1 hour without increased symptoms or restriction. [] Progressing: [] Met: [x] Not Met: [] Adjusted  5. Patient will be able to returning to caring for her children without issues or limitations. [] Progressing: [x] Met: [] Not Met: [] Adjusted    Overall Progression Towards Functional goals/ Treatment Progress Update:  [x] Patient is progressing as expected towards functional goals listed. [] Progression is slowed due to complexities/Impairments listed. [] Progression has been slowed due to co-morbidities.   [] Plan just implemented, too soon to assess goals progression <30days   [] Goals require adjustment due to lack of progress  [] Patient is not progressing as expected and requires additional follow up with physician  [] Other    Persisting Functional Limitations/Impairments:  []Sleeping []Sitting               []Standing []Transfers        []Walking []Kneeling               []Stairs []Squatting / bending   [x]ADLs [x]Reaching  [x]Lifting  [x]Housework  [x]Driving []Job related tasks  []Sports/Recreation []Other:        ASSESSMENT:  Pt has progressed with PT POC with good improvements in all areas. Currently pt has demonstrated NO issues with ROM, mild isometric strength deficits with biggest issues being with lack of full functional activity tolerance with prolonged activity. Pt is able to modify pain easily when experienced. Most activity at home has been significantly improved with pt subjective reporting as well as currently pt has met all but 1 goal and will put PT on hold currently. F/u prn. Treatment/Activity Tolerance:  [] Patient able to complete tx  [x] Patient limited by fatigue  [] Patient limited by pain  [] Patient limited by other medical complications  [] Other:     Prognosis: [x] Good [] Fair  [] Poor    Patient Requires Follow-up: [] Yes  [x] No    Plan for next treatment session: trial d/c to HEP    PLAN:   [x] Continue per plan of care [x] f/u prn  [] Plan of care initiated [] Hold pending MD visit [] Discharge    Electronically signed by: Nabila Blackwell PT    Note: If patient does not return for scheduled/ recommended follow up visits, his note will serve as a discharge from care along with most recent update on progress.

## 2022-11-09 ENCOUNTER — TELEPHONE (OUTPATIENT)
Dept: FAMILY MEDICINE CLINIC | Age: 42
End: 2022-11-09

## 2022-11-09 NOTE — TELEPHONE ENCOUNTER
----- Message from Leno sent at 11/9/2022  1:26 PM EST -----  Subject: Appointment Request    Reason for Call: Established Patient Appointment needed: Routine Medicare   AWV    QUESTIONS    Reason for appointment request? No appointments available during search     Additional Information for Provider? AWV, screened green.  no appts   available, asked for callback for scheduling.   ---------------------------------------------------------------------------  --------------  [id number] [employer]  [phone]; OK to leave message on voicemail  ---------------------------------------------------------------------------  --------------  SCRIPT ANSWERS  LEEANNA Screen: Buffy Winters

## 2022-12-12 ENCOUNTER — OFFICE VISIT (OUTPATIENT)
Dept: FAMILY MEDICINE CLINIC | Age: 42
End: 2022-12-12
Payer: MEDICAID

## 2022-12-12 VITALS
WEIGHT: 165.4 LBS | OXYGEN SATURATION: 98 % | DIASTOLIC BLOOD PRESSURE: 82 MMHG | HEIGHT: 66 IN | SYSTOLIC BLOOD PRESSURE: 132 MMHG | RESPIRATION RATE: 16 BRPM | HEART RATE: 98 BPM | BODY MASS INDEX: 26.58 KG/M2 | TEMPERATURE: 98.4 F

## 2022-12-12 DIAGNOSIS — Z00.00 ENCOUNTER FOR WELL ADULT EXAM WITHOUT ABNORMAL FINDINGS: Primary | ICD-10-CM

## 2022-12-12 DIAGNOSIS — Z23 FLU VACCINE NEED: ICD-10-CM

## 2022-12-12 DIAGNOSIS — Z12.31 ENCOUNTER FOR SCREENING MAMMOGRAM FOR MALIGNANT NEOPLASM OF BREAST: ICD-10-CM

## 2022-12-12 DIAGNOSIS — Z00.00 WELL ADULT EXAM: ICD-10-CM

## 2022-12-12 PROCEDURE — 90674 CCIIV4 VAC NO PRSV 0.5 ML IM: CPT | Performed by: FAMILY MEDICINE

## 2022-12-12 PROCEDURE — G8482 FLU IMMUNIZE ORDER/ADMIN: HCPCS | Performed by: FAMILY MEDICINE

## 2022-12-12 PROCEDURE — 90471 IMMUNIZATION ADMIN: CPT | Performed by: FAMILY MEDICINE

## 2022-12-12 PROCEDURE — 36415 COLL VENOUS BLD VENIPUNCTURE: CPT | Performed by: FAMILY MEDICINE

## 2022-12-12 PROCEDURE — 99396 PREV VISIT EST AGE 40-64: CPT | Performed by: FAMILY MEDICINE

## 2022-12-12 SDOH — ECONOMIC STABILITY: FOOD INSECURITY: WITHIN THE PAST 12 MONTHS, THE FOOD YOU BOUGHT JUST DIDN'T LAST AND YOU DIDN'T HAVE MONEY TO GET MORE.: NEVER TRUE

## 2022-12-12 SDOH — ECONOMIC STABILITY: FOOD INSECURITY: WITHIN THE PAST 12 MONTHS, YOU WORRIED THAT YOUR FOOD WOULD RUN OUT BEFORE YOU GOT MONEY TO BUY MORE.: NEVER TRUE

## 2022-12-12 ASSESSMENT — PATIENT HEALTH QUESTIONNAIRE - PHQ9
SUM OF ALL RESPONSES TO PHQ QUESTIONS 1-9: 0
2. FEELING DOWN, DEPRESSED OR HOPELESS: 0
SUM OF ALL RESPONSES TO PHQ QUESTIONS 1-9: 0
SUM OF ALL RESPONSES TO PHQ QUESTIONS 1-9: 0
1. LITTLE INTEREST OR PLEASURE IN DOING THINGS: 0
SUM OF ALL RESPONSES TO PHQ9 QUESTIONS 1 & 2: 0
SUM OF ALL RESPONSES TO PHQ QUESTIONS 1-9: 0

## 2022-12-12 ASSESSMENT — ENCOUNTER SYMPTOMS
NAUSEA: 0
VOMITING: 0
CHEST TIGHTNESS: 0
SHORTNESS OF BREATH: 0
RHINORRHEA: 0
ABDOMINAL PAIN: 0
WHEEZING: 0
TROUBLE SWALLOWING: 0
EYE REDNESS: 0
EYE ITCHING: 0

## 2022-12-12 ASSESSMENT — SOCIAL DETERMINANTS OF HEALTH (SDOH): HOW HARD IS IT FOR YOU TO PAY FOR THE VERY BASICS LIKE FOOD, HOUSING, MEDICAL CARE, AND HEATING?: NOT VERY HARD

## 2022-12-12 NOTE — PROGRESS NOTES
Well Adult Note  Name: Romi Baugh Date: 2022   MRN: [id number] Sex: Female   Age: 43 y.o. Ethnicity: Non- / Non    : 1980 Race: Kevin Brewster is here for well adult exam.  History:  Well Adult Physical   Patient here for a comprehensive physical exam.The patient reports problems -   None,   Do you take any herbs or supplements that were not prescribed by a doctor? no Are you taking calcium supplements? no Are you taking aspirin daily? not applicable   History:  Pap utd   Mammogram due   Tdap utd          Review of Systems   Constitutional:  Negative for activity change, appetite change, fatigue, fever and unexpected weight change. HENT:  Negative for congestion, postnasal drip, rhinorrhea and trouble swallowing. Eyes:  Negative for redness and itching. Respiratory:  Negative for chest tightness, shortness of breath and wheezing. Cardiovascular:  Negative for chest pain and palpitations. Gastrointestinal:  Negative for abdominal pain, nausea and vomiting. Endocrine: Negative for polydipsia, polyphagia and polyuria. Genitourinary:  Negative for dysuria and urgency. Musculoskeletal:  Negative for arthralgias, joint swelling, myalgias and neck pain. Skin:  Negative for rash. Neurological:  Negative for light-headedness and headaches. Hematological:  Negative for adenopathy. Psychiatric/Behavioral:  The patient is not nervous/anxious. No Known Allergies      Prior to Visit Medications    Not on File       No past medical history on file. No past surgical history on file.       Family History   Problem Relation Age of Onset    Heart Attack Mother 62    High Blood Pressure Mother     Diabetes Father         (d) at 81 yo     Heart Disease Father     High Blood Pressure Father     Other Other 8        autism        Social History     Tobacco Use    Smoking status: Never    Smokeless tobacco: Never   Substance Use Topics    Alcohol use: Not Currently    Drug use: Never       Objective   /82   Pulse 98   Temp 98.4 °F (36.9 °C) (Tympanic)   Resp 16   Ht 5' 5.5\" (1.664 m)   Wt 165 lb 6.4 oz (75 kg)   SpO2 98%   BMI 27.11 kg/m²   Wt Readings from Last 3 Encounters:   12/12/22 165 lb 6.4 oz (75 kg)   01/10/20 154 lb 11.2 oz (70.2 kg)   12/06/19 156 lb (70.8 kg)     There were no vitals filed for this visit. Physical Exam  Vitals and nursing note reviewed. Constitutional:       General: She is not in acute distress. Appearance: Normal appearance. She is well-developed. She is not ill-appearing, toxic-appearing or diaphoretic. HENT:      Head: Normocephalic and atraumatic. Right Ear: Tympanic membrane, ear canal and external ear normal. There is no impacted cerumen. Left Ear: Tympanic membrane, ear canal and external ear normal. There is no impacted cerumen. Eyes:      General: No scleral icterus. Right eye: No discharge. Left eye: No discharge. Extraocular Movements: Extraocular movements intact. Conjunctiva/sclera: Conjunctivae normal.      Pupils: Pupils are equal, round, and reactive to light. Neck:      Thyroid: No thyromegaly. Vascular: No JVD. Trachea: No tracheal deviation. Cardiovascular:      Rate and Rhythm: Normal rate and regular rhythm. Pulses: Normal pulses. Heart sounds: Normal heart sounds. No murmur heard. No friction rub. No gallop. Pulmonary:      Effort: Pulmonary effort is normal. No respiratory distress. Breath sounds: Normal breath sounds. No stridor. No wheezing, rhonchi or rales. Chest:      Chest wall: No tenderness. Abdominal:      General: Bowel sounds are normal. There is no distension. Palpations: Abdomen is soft. There is no mass. Tenderness: There is no abdominal tenderness. There is no guarding or rebound. Musculoskeletal:         General: No tenderness. Normal range of motion.       Cervical back: Normal range of motion and neck supple. Right lower leg: No edema. Left lower leg: No edema. Lymphadenopathy:      Cervical: No cervical adenopathy. Skin:     General: Skin is warm. Capillary Refill: Capillary refill takes less than 2 seconds. Coloration: Skin is not pale. Findings: No erythema or rash. Neurological:      General: No focal deficit present. Mental Status: She is alert and oriented to person, place, and time. Mental status is at baseline. Cranial Nerves: No cranial nerve deficit. Motor: No weakness or abnormal muscle tone. Coordination: Coordination normal.      Gait: Gait normal.      Deep Tendon Reflexes: Reflexes are normal and symmetric. Psychiatric:         Mood and Affect: Mood normal.         Behavior: Behavior normal.         Thought Content: Thought content normal.         Judgment: Judgment normal.         Assessment   Plan       Diagnosis Orders   1. Well adult exam      . Doing well, encourage healthy diet, exercise, safety    . Screening labs orders given   . Preventive:recommended covid booster     . Mammogram: referred        Basic Metabolic Panel    Lipid Panel    CBC with Auto Differential    Hepatitis C Antibody      2. Flu vaccine need  Influenza, FLUCELVAX, (age 10 mo+), IM, Preservative Free, 0.5 mL      3.  Encounter for screening mammogram for malignant neoplasm of breast  GIGI DIGITAL SCREEN W OR WO CAD BILATERAL                Personalized Preventive Plan   Current Health Maintenance Status  Immunization History   Administered Date(s) Administered    COVID-19, PFIZER PURPLE top, DILUTE for use, (age 15 y+), 30mcg/0.3mL 04/30/2021, 05/21/2021    Influenza, FLUARIX, FLULAVAL, FLUZONE (age 10 mo+) AND AFLURIA, (age 1 y+), PF, 0.5mL 12/06/2019    Influenza, FLUCELVAX, (age 10 mo+), MDCK, PF, 0.5mL 12/12/2022    Tdap (Boostrix, Adacel) 12/06/2019        Health Maintenance   Topic Date Due    Depression Screen  Never done    Hepatitis C screen  Never done    Diabetes screen  Never done    COVID-19 Vaccine (3 - Booster for Pfizer series) 07/16/2021    Lipids  12/12/2024    Cervical cancer screen  01/10/2025    DTaP/Tdap/Td vaccine (2 - Td or Tdap) 12/06/2029    Flu vaccine  Completed    HIV screen  Completed    Hepatitis A vaccine  Aged Out    Hib vaccine  Aged Out    Meningococcal (ACWY) vaccine  Aged Out    Pneumococcal 0-64 years Vaccine  Aged Out    Varicella vaccine  Discontinued     Recommendations for Preventive Services Due: see orders and patient instructions/AVS.    Fu prn

## 2022-12-13 DIAGNOSIS — E83.52 HYPERCALCEMIA: Primary | ICD-10-CM

## 2022-12-13 DIAGNOSIS — E83.52 HYPERCALCEMIA: ICD-10-CM

## 2022-12-13 LAB
ANION GAP SERPL CALCULATED.3IONS-SCNC: 11 MMOL/L (ref 3–16)
BASOPHILS ABSOLUTE: 0 K/UL (ref 0–0.2)
BASOPHILS RELATIVE PERCENT: 0.5 %
BUN BLDV-MCNC: 5 MG/DL (ref 7–20)
CALCIUM SERPL-MCNC: 11.5 MG/DL (ref 8.3–10.6)
CHLORIDE BLD-SCNC: 103 MMOL/L (ref 99–110)
CHOLESTEROL, TOTAL: 184 MG/DL (ref 0–199)
CO2: 24 MMOL/L (ref 21–32)
CREAT SERPL-MCNC: 0.7 MG/DL (ref 0.6–1.1)
EOSINOPHILS ABSOLUTE: 0 K/UL (ref 0–0.6)
EOSINOPHILS RELATIVE PERCENT: 0.6 %
GFR SERPL CREATININE-BSD FRML MDRD: >60 ML/MIN/{1.73_M2}
GLUCOSE BLD-MCNC: 90 MG/DL (ref 70–99)
HCT VFR BLD CALC: 29.5 % (ref 36–48)
HDLC SERPL-MCNC: 81 MG/DL (ref 40–60)
HEMOGLOBIN: 8.9 G/DL (ref 12–16)
HEPATITIS C ANTIBODY INTERPRETATION: NORMAL
LDL CHOLESTEROL CALCULATED: 94 MG/DL
LYMPHOCYTES ABSOLUTE: 1.2 K/UL (ref 1–5.1)
LYMPHOCYTES RELATIVE PERCENT: 22.7 %
MCH RBC QN AUTO: 23.3 PG (ref 26–34)
MCHC RBC AUTO-ENTMCNC: 30.3 G/DL (ref 31–36)
MCV RBC AUTO: 76.8 FL (ref 80–100)
MONOCYTES ABSOLUTE: 0.3 K/UL (ref 0–1.3)
MONOCYTES RELATIVE PERCENT: 6.1 %
NEUTROPHILS ABSOLUTE: 3.7 K/UL (ref 1.7–7.7)
NEUTROPHILS RELATIVE PERCENT: 70.1 %
PARATHYROID HORMONE INTACT: 122.9 PG/ML (ref 14–72)
PDW BLD-RTO: 17.6 % (ref 12.4–15.4)
PLATELET # BLD: 332 K/UL (ref 135–450)
PMV BLD AUTO: 8.6 FL (ref 5–10.5)
POTASSIUM SERPL-SCNC: 4.5 MMOL/L (ref 3.5–5.1)
RBC # BLD: 3.84 M/UL (ref 4–5.2)
SODIUM BLD-SCNC: 138 MMOL/L (ref 136–145)
TRIGL SERPL-MCNC: 46 MG/DL (ref 0–150)
VITAMIN D 25-HYDROXY: 11.9 NG/ML
VLDLC SERPL CALC-MCNC: 9 MG/DL
WBC # BLD: 5.2 K/UL (ref 4–11)

## 2022-12-13 PROCEDURE — 36415 COLL VENOUS BLD VENIPUNCTURE: CPT | Performed by: FAMILY MEDICINE

## 2022-12-14 RX ORDER — ERGOCALCIFEROL (VITAMIN D2) 1250 MCG
50000 CAPSULE ORAL WEEKLY
Qty: 12 CAPSULE | Refills: 0 | Status: SHIPPED | OUTPATIENT
Start: 2022-12-14 | End: 2022-12-26

## 2023-02-07 ENCOUNTER — HOSPITAL ENCOUNTER (OUTPATIENT)
Dept: MAMMOGRAPHY | Age: 43
Discharge: HOME | End: 2023-02-07
Payer: MEDICAID

## 2023-02-07 VITALS — WEIGHT: 163 LBS | HEIGHT: 65 IN | BODY MASS INDEX: 27.16 KG/M2

## 2023-02-07 DIAGNOSIS — Z12.31 VISIT FOR SCREENING MAMMOGRAM: ICD-10-CM

## 2023-02-07 PROCEDURE — 77063 BREAST TOMOSYNTHESIS BI: CPT

## 2023-02-10 DIAGNOSIS — R92.8 ABNORMAL MAMMOGRAM: Primary | ICD-10-CM

## 2023-02-21 ENCOUNTER — HOSPITAL ENCOUNTER (OUTPATIENT)
Dept: MAMMOGRAPHY | Age: 43
Discharge: HOME | End: 2023-02-21
Payer: MEDICAID

## 2023-02-21 ENCOUNTER — HOSPITAL ENCOUNTER (OUTPATIENT)
Dept: ULTRASOUND IMAGING | Age: 43
Discharge: HOME | End: 2023-02-21
Payer: MEDICAID

## 2023-02-21 DIAGNOSIS — R92.8 ABNORMAL MAMMOGRAM: ICD-10-CM

## 2023-02-21 PROCEDURE — 76642 ULTRASOUND BREAST LIMITED: CPT

## 2023-02-21 PROCEDURE — G0279 TOMOSYNTHESIS, MAMMO: HCPCS

## 2023-03-14 DIAGNOSIS — E83.52 HYPERCALCEMIA: ICD-10-CM

## 2023-03-14 DIAGNOSIS — E83.52 HYPERCALCEMIA: Primary | ICD-10-CM

## 2023-03-14 DIAGNOSIS — D64.9 LOW HEMOGLOBIN: ICD-10-CM

## 2023-03-14 LAB
BASOPHILS # BLD: 0 K/UL (ref 0–0.2)
BASOPHILS NFR BLD: 0.8 %
DEPRECATED RDW RBC AUTO: 15.3 % (ref 12.4–15.4)
EOSINOPHIL # BLD: 0.1 K/UL (ref 0–0.6)
EOSINOPHIL NFR BLD: 1.3 %
HCT VFR BLD AUTO: 33.7 % (ref 36–48)
HGB BLD-MCNC: 10.5 G/DL (ref 12–16)
LYMPHOCYTES # BLD: 1.3 K/UL (ref 1–5.1)
LYMPHOCYTES NFR BLD: 32.7 %
MCH RBC QN AUTO: 24.9 PG (ref 26–34)
MCHC RBC AUTO-ENTMCNC: 31 G/DL (ref 31–36)
MCV RBC AUTO: 80.2 FL (ref 80–100)
MONOCYTES # BLD: 0.3 K/UL (ref 0–1.3)
MONOCYTES NFR BLD: 8.1 %
NEUTROPHILS # BLD: 2.2 K/UL (ref 1.7–7.7)
NEUTROPHILS NFR BLD: 57.1 %
PLATELET # BLD AUTO: 280 K/UL (ref 135–450)
PMV BLD AUTO: 9.2 FL (ref 5–10.5)
RBC # BLD AUTO: 4.2 M/UL (ref 4–5.2)
WBC # BLD AUTO: 3.9 K/UL (ref 4–11)

## 2023-03-14 PROCEDURE — 36415 COLL VENOUS BLD VENIPUNCTURE: CPT | Performed by: FAMILY MEDICINE

## 2023-03-15 DIAGNOSIS — E83.52 HYPERCALCEMIA: ICD-10-CM

## 2023-03-15 DIAGNOSIS — E21.3 HYPERPARATHYROIDISM (HCC): Primary | ICD-10-CM

## 2023-03-15 LAB
25(OH)D3 SERPL-MCNC: 38.3 NG/ML
PTH-INTACT SERPL-MCNC: 123.4 PG/ML (ref 14–72)

## 2023-08-15 PROBLEM — E21.0 HYPERPARATHYROIDISM, PRIMARY (HCC): Status: ACTIVE | Noted: 2023-08-15

## 2023-08-16 ENCOUNTER — OFFICE VISIT (OUTPATIENT)
Dept: ENDOCRINOLOGY | Age: 43
End: 2023-08-16
Payer: MEDICAID

## 2023-08-16 VITALS
BODY MASS INDEX: 26.16 KG/M2 | OXYGEN SATURATION: 100 % | TEMPERATURE: 98 F | WEIGHT: 157 LBS | DIASTOLIC BLOOD PRESSURE: 87 MMHG | RESPIRATION RATE: 14 BRPM | HEART RATE: 77 BPM | SYSTOLIC BLOOD PRESSURE: 131 MMHG | HEIGHT: 65 IN

## 2023-08-16 DIAGNOSIS — E21.0 HYPERPARATHYROIDISM, PRIMARY (HCC): Primary | ICD-10-CM

## 2023-08-16 DIAGNOSIS — E04.9 GOITER: ICD-10-CM

## 2023-08-16 DIAGNOSIS — E55.9 VITAMIN D DEFICIENCY: ICD-10-CM

## 2023-08-16 PROCEDURE — 1036F TOBACCO NON-USER: CPT | Performed by: INTERNAL MEDICINE

## 2023-08-16 PROCEDURE — 99204 OFFICE O/P NEW MOD 45 MIN: CPT | Performed by: INTERNAL MEDICINE

## 2023-08-16 PROCEDURE — G8419 CALC BMI OUT NRM PARAM NOF/U: HCPCS | Performed by: INTERNAL MEDICINE

## 2023-08-16 PROCEDURE — G8427 DOCREV CUR MEDS BY ELIG CLIN: HCPCS | Performed by: INTERNAL MEDICINE

## 2023-08-16 RX ORDER — FERROUS SULFATE 325(65) MG
325 TABLET ORAL
COMMUNITY

## 2023-08-16 NOTE — PROGRESS NOTES
status:      Spouse name: None    Number of children: None    Years of education: None    Highest education level: None   Tobacco Use    Smoking status: Never    Smokeless tobacco: Never   Vaping Use    Vaping Use: Never used   Substance and Sexual Activity    Alcohol use: Not Currently    Drug use: Never    Sexual activity: Yes     Partners: Male     Social Determinants of Health     Financial Resource Strain: Low Risk     Difficulty of Paying Living Expenses: Not very hard   Food Insecurity: No Food Insecurity    Worried About Running Out of Food in the Last Year: Never true    Ran Out of Food in the Last Year: Never true     Current Outpatient Medications   Medication Sig Dispense Refill    ferrous sulfate (IRON 325) 325 (65 Fe) MG tablet Take 1 tablet by mouth daily (with breakfast)      ergocalciferol (DRISDOL) 1.25 MG (24077 UT) capsule Take 1 capsule by mouth once a week for 12 days 12 capsule 0     No current facility-administered medications for this visit.      No Known Allergies  Family Status   Relation Name Status    Mother      Father      Sister  Alive    Brother  Alive    MGM      MGF      PGM      PGF      Other  (Not Specified)       Review of Systems:  Constitutional: has fatigue, no fever, no recent weight gain, has recent weight loss, no changes in appetite  Eyes: no eye pain, no change in vision, no eye redness, no eye irritation, no double vision  Ears, nose, throat: no nasal congestion, no sore throat, no earache, no decrease in hearing, no hoarseness, no dry mouth, no sinus problems, no difficulty swallowing, no neck lumps, no dental problems, no mouth sores, no ringing in ears  Pulmonary: no shortness of breath, no wheezing, no dyspnea on exertion, no cough  Cardiovascular: had chest pain, has lower extremity edema, no orthopnea, no intermittent leg claudication, no palpitations  Gastrointestinal: no abdominal pain, no nausea, no

## 2023-11-08 ENCOUNTER — OFFICE VISIT (OUTPATIENT)
Dept: ENDOCRINOLOGY | Age: 43
End: 2023-11-08
Payer: MEDICAID

## 2023-11-08 VITALS
HEIGHT: 65 IN | HEART RATE: 76 BPM | RESPIRATION RATE: 14 BRPM | DIASTOLIC BLOOD PRESSURE: 88 MMHG | SYSTOLIC BLOOD PRESSURE: 138 MMHG | TEMPERATURE: 98 F | BODY MASS INDEX: 26.99 KG/M2 | OXYGEN SATURATION: 100 % | WEIGHT: 162 LBS

## 2023-11-08 DIAGNOSIS — E55.9 VITAMIN D DEFICIENCY: ICD-10-CM

## 2023-11-08 DIAGNOSIS — E04.9 GOITER: ICD-10-CM

## 2023-11-08 DIAGNOSIS — E21.0 HYPERPARATHYROIDISM, PRIMARY (HCC): Primary | ICD-10-CM

## 2023-11-08 PROCEDURE — G8427 DOCREV CUR MEDS BY ELIG CLIN: HCPCS | Performed by: INTERNAL MEDICINE

## 2023-11-08 PROCEDURE — G8484 FLU IMMUNIZE NO ADMIN: HCPCS | Performed by: INTERNAL MEDICINE

## 2023-11-08 PROCEDURE — 99214 OFFICE O/P EST MOD 30 MIN: CPT | Performed by: INTERNAL MEDICINE

## 2023-11-08 PROCEDURE — G8419 CALC BMI OUT NRM PARAM NOF/U: HCPCS | Performed by: INTERNAL MEDICINE

## 2023-11-08 PROCEDURE — 1036F TOBACCO NON-USER: CPT | Performed by: INTERNAL MEDICINE

## 2023-11-08 NOTE — PROGRESS NOTES
[facility])  Did not do labs and imaging. Explained how to do testing. Counseled patient about hyperparathyroidism causes, work-up, treatment options, indications for surgery. Counseled patient regarding work-up, 24-hour urine collection, blood work, imaging, scheduling. Reevaluate when results available. - Calcium Ionized Serum; Future  - PTH, Intact; Future  - Phosphorus; Future  - Magnesium; Future  - Vitamin D 25 Hydroxy; Future  - Electrophoresis Protein, Serum; Future  - Immunofixation serum profile; Future  - Calcium, 24 HR Urine; Future  - Sodium, urine, 24 hour; Future  - Creatinine Clearance, Urine, 24 HR; Future  - Comprehensive Metabolic Panel; Future  - DEXA BONE DENSITY AXIAL SKELETON; Future  - NM PARATHYROID SCAN; Future    2. Vitamin D deficiency. Patient  - Vitamin D 25 Hydroxy; Future  - Comprehensive Metabolic Panel; Future    3. Goiter  - T3, Free; Future  - T4, Free; Future  - TSH; Future  - Anti-Thyroglobulin Antibody; Future  - Thyroid Peroxidase Antibody; Future  - US HEAD NECK SOFT TISSUE THYROID; Future      Reviewed and/or ordered clinical lab results Yes  Reviewed and/or ordered radiology tests Yes   Reviewed and/or ordered other diagnostic tests No  Discussed test results with performing physician No  Independently reviewed image, tracing, or specimen No  Made a decision to obtain old records No  Reviewed and summarized old records Yes  Hemoglobin 8.9-10.5  .9-123.4  25-hydroxy vitamin D 11.9-38.3  Calcium 10.8-11.5, ULN 10.6 calcium 2013 9.3  Obtained history from other than patient No    Wendi Barr was counseled regarding symptoms of hyperparathyroidism, vitamin D deficiency, [id number] diagnosis, course and complications of disease if inadequately treated, side effects of medications, diagnosis, treatment options, and prognosis, risks, benefits, complications, and alternatives of treatment, labs, imaging and other studies and treatment targets and goals.   She understands

## 2023-12-30 ENCOUNTER — TELEPHONE (OUTPATIENT)
Dept: ENDOCRINOLOGY | Age: 43
End: 2023-12-30

## 2023-12-30 DIAGNOSIS — R89.9 ABNORMAL LABORATORY TEST RESULT: ICD-10-CM

## 2023-12-30 DIAGNOSIS — R60.9 EDEMA, UNSPECIFIED TYPE: Primary | ICD-10-CM

## 2023-12-30 DIAGNOSIS — R07.9 CHEST PAIN, UNSPECIFIED TYPE: ICD-10-CM

## 2023-12-31 NOTE — TELEPHONE ENCOUNTER
Please inform patient:  Lab work is consistent with hyperparathyroidism as it was discussed before, however, because 1, 25 dihydroxy vitamin D is elevated and she has polyclonal hypergammaglobulinemia, need to rule out granulomatous process.  Usually that is done ordering chest x-ray.  Does she have any shortness of breath, cough, any respiratory symptoms?  Does she still have chest pain, edema?    I will order chest x-ray.  Please ask patient to schedule in the hospital where she usually gets imaging done.  I also do not see parathyroid scan and thyroid ultrasound results, please remind patient to do them, give scheduling number for [facility] or [facility] if needed.  If she goes somewhere else, make sure she has paper orders for everything.

## 2024-01-02 NOTE — TELEPHONE ENCOUNTER
Called and informed pt, pt expressed understanding.    Pt will call central scheduling to get these scheduled.    She denies any sx other than chest pain about once a month.

## 2024-02-21 ENCOUNTER — OFFICE VISIT (OUTPATIENT)
Dept: ENDOCRINOLOGY | Age: 44
End: 2024-02-21
Payer: MEDICAID

## 2024-02-21 VITALS
DIASTOLIC BLOOD PRESSURE: 97 MMHG | HEART RATE: 81 BPM | RESPIRATION RATE: 14 BRPM | TEMPERATURE: 98 F | BODY MASS INDEX: 25.99 KG/M2 | HEIGHT: 65 IN | OXYGEN SATURATION: 100 % | WEIGHT: 156 LBS | SYSTOLIC BLOOD PRESSURE: 143 MMHG

## 2024-02-21 DIAGNOSIS — E04.9 GOITER: ICD-10-CM

## 2024-02-21 DIAGNOSIS — E21.0 HYPERPARATHYROIDISM, PRIMARY (HCC): Primary | ICD-10-CM

## 2024-02-21 DIAGNOSIS — E55.9 VITAMIN D DEFICIENCY: ICD-10-CM

## 2024-02-21 PROCEDURE — 1036F TOBACCO NON-USER: CPT | Performed by: INTERNAL MEDICINE

## 2024-02-21 PROCEDURE — G8427 DOCREV CUR MEDS BY ELIG CLIN: HCPCS | Performed by: INTERNAL MEDICINE

## 2024-02-21 PROCEDURE — G8484 FLU IMMUNIZE NO ADMIN: HCPCS | Performed by: INTERNAL MEDICINE

## 2024-02-21 PROCEDURE — 99215 OFFICE O/P EST HI 40 MIN: CPT | Performed by: INTERNAL MEDICINE

## 2024-02-21 PROCEDURE — G8419 CALC BMI OUT NRM PARAM NOF/U: HCPCS | Performed by: INTERNAL MEDICINE

## 2024-02-21 NOTE — PROGRESS NOTES
radiology tests Yes   Reviewed and/or ordered other diagnostic tests No  Discussed test results with performing physician No  Independently reviewed image, tracing, or specimen No  Made a decision to obtain old records No  Reviewed and summarized old records Yes  Hemoglobin 8.9-10.5  .9-123.4  25-hydroxy vitamin D 11.9-38.3  Calcium 10.8-11.5, ULN 10.6 calcium 2013 9.3  Obtained history from other than patient No    Leigh Phan was counseled regarding symptoms of hyperparathyroidism, vitamin D deficiency, [id number] diagnosis, course and complications of disease if inadequately treated, side effects of medications, diagnosis, treatment options, and prognosis, risks, benefits, complications, and alternatives of treatment, labs, imaging and other studies and treatment targets and goals.  She understands instructions and counseling.    Total time I spent for this encounter 40 minutes    Return in about 3 months (around 5/21/2024) for parathyroid .    Electronically signed by Ale Trjeo MD on 2/21/2024 at 11:29 AM

## 2024-02-26 ENCOUNTER — HOSPITAL ENCOUNTER (OUTPATIENT)
Dept: NUCLEAR MEDICINE | Age: 44
Discharge: HOME | End: 2024-02-26
Payer: MEDICAID

## 2024-02-26 DIAGNOSIS — E21.0 HYPERPARATHYROIDISM, PRIMARY (HCC): ICD-10-CM

## 2024-02-26 PROCEDURE — 3430000000 HC RX DIAGNOSTIC RADIOPHARMACEUTICAL: Performed by: INTERNAL MEDICINE

## 2024-02-26 PROCEDURE — 78070 PARATHYROID PLANAR IMAGING: CPT

## 2024-02-26 PROCEDURE — A9500 TC99M SESTAMIBI: HCPCS | Performed by: INTERNAL MEDICINE

## 2024-02-26 RX ORDER — TETRAKIS(2-METHOXYISOBUTYLISOCYANIDE)COPPER(I) TETRAFLUOROBORATE 1 MG/ML
25 INJECTION, POWDER, LYOPHILIZED, FOR SOLUTION INTRAVENOUS
Status: COMPLETED | OUTPATIENT
Start: 2024-02-26 | End: 2024-02-26

## 2024-02-26 RX ADMIN — Medication 25 MILLICURIE: at 10:34

## 2024-02-27 ASSESSMENT — PATIENT HEALTH QUESTIONNAIRE - PHQ9
2. FEELING DOWN, DEPRESSED OR HOPELESS: 0
SUM OF ALL RESPONSES TO PHQ QUESTIONS 1-9: 0
SUM OF ALL RESPONSES TO PHQ9 QUESTIONS 1 & 2: 0
SUM OF ALL RESPONSES TO PHQ QUESTIONS 1-9: 0
1. LITTLE INTEREST OR PLEASURE IN DOING THINGS: NOT AT ALL
SUM OF ALL RESPONSES TO PHQ9 QUESTIONS 1 & 2: 0
2. FEELING DOWN, DEPRESSED OR HOPELESS: NOT AT ALL
1. LITTLE INTEREST OR PLEASURE IN DOING THINGS: 0

## 2024-02-28 ENCOUNTER — HOSPITAL ENCOUNTER (OUTPATIENT)
Dept: ULTRASOUND IMAGING | Age: 44
Discharge: HOME | End: 2024-02-28
Attending: INTERNAL MEDICINE
Payer: MEDICAID

## 2024-02-28 ENCOUNTER — HOSPITAL ENCOUNTER (OUTPATIENT)
Dept: GENERAL RADIOLOGY | Age: 44
Discharge: HOME | End: 2024-02-28
Attending: INTERNAL MEDICINE
Payer: MEDICAID

## 2024-02-28 DIAGNOSIS — E04.9 GOITER: ICD-10-CM

## 2024-02-28 DIAGNOSIS — E21.0 HYPERPARATHYROIDISM, PRIMARY (HCC): ICD-10-CM

## 2024-02-28 PROCEDURE — 76536 US EXAM OF HEAD AND NECK: CPT

## 2024-02-28 PROCEDURE — 77081 DXA BONE DENSITY APPENDICULR: CPT

## 2024-02-28 PROCEDURE — 77080 DXA BONE DENSITY AXIAL: CPT

## 2024-03-01 ENCOUNTER — TELEPHONE (OUTPATIENT)
Dept: ENDOCRINOLOGY | Age: 44
End: 2024-03-01

## 2024-03-01 ENCOUNTER — OFFICE VISIT (OUTPATIENT)
Dept: FAMILY MEDICINE CLINIC | Age: 44
End: 2024-03-01
Payer: MEDICAID

## 2024-03-01 VITALS
WEIGHT: 156 LBS | SYSTOLIC BLOOD PRESSURE: 136 MMHG | OXYGEN SATURATION: 96 % | DIASTOLIC BLOOD PRESSURE: 88 MMHG | HEART RATE: 84 BPM | HEIGHT: 65 IN | TEMPERATURE: 97.8 F | BODY MASS INDEX: 25.99 KG/M2

## 2024-03-01 DIAGNOSIS — Z00.00 ENCOUNTER FOR WELL ADULT EXAM WITHOUT ABNORMAL FINDINGS: Primary | ICD-10-CM

## 2024-03-01 PROCEDURE — G8484 FLU IMMUNIZE NO ADMIN: HCPCS | Performed by: FAMILY MEDICINE

## 2024-03-01 PROCEDURE — 99396 PREV VISIT EST AGE 40-64: CPT | Performed by: FAMILY MEDICINE

## 2024-03-01 SDOH — ECONOMIC STABILITY: INCOME INSECURITY: HOW HARD IS IT FOR YOU TO PAY FOR THE VERY BASICS LIKE FOOD, HOUSING, MEDICAL CARE, AND HEATING?: NOT HARD AT ALL

## 2024-03-01 SDOH — ECONOMIC STABILITY: FOOD INSECURITY: WITHIN THE PAST 12 MONTHS, YOU WORRIED THAT YOUR FOOD WOULD RUN OUT BEFORE YOU GOT MONEY TO BUY MORE.: NEVER TRUE

## 2024-03-01 SDOH — ECONOMIC STABILITY: HOUSING INSECURITY
IN THE LAST 12 MONTHS, WAS THERE A TIME WHEN YOU DID NOT HAVE A STEADY PLACE TO SLEEP OR SLEPT IN A SHELTER (INCLUDING NOW)?: NO

## 2024-03-01 SDOH — ECONOMIC STABILITY: FOOD INSECURITY: WITHIN THE PAST 12 MONTHS, THE FOOD YOU BOUGHT JUST DIDN'T LAST AND YOU DIDN'T HAVE MONEY TO GET MORE.: NEVER TRUE

## 2024-03-01 ASSESSMENT — ENCOUNTER SYMPTOMS
CHEST TIGHTNESS: 0
RHINORRHEA: 0
EYE ITCHING: 0
VOMITING: 0
WHEEZING: 0
TROUBLE SWALLOWING: 0
SHORTNESS OF BREATH: 0
EYE REDNESS: 0
ABDOMINAL PAIN: 0
NAUSEA: 0

## 2024-03-01 NOTE — PROGRESS NOTES
Well Adult Note  Name: Leigh Phan Today’s Date: 3/1/2024   MRN: [id number] Sex: Female   Age: 44 y.o. Ethnicity: Unavailable / Unknown   : 1980 Race: Black /       Leigh Phan is here for well adult exam.  History:  Well Adult Physical   Patient here for a comprehensive physical exam.The patient reports problems - none   Do you take any herbs or supplements that were not prescribed by a doctor? yes Are you taking calcium supplements? no Are you taking aspirin daily? not applicable   History:  Pap: due       Review of Systems   Constitutional:  Negative for activity change, appetite change, fatigue, fever and unexpected weight change.   HENT:  Negative for congestion, postnasal drip, rhinorrhea and trouble swallowing.    Eyes:  Negative for redness and itching.   Respiratory:  Negative for chest tightness, shortness of breath and wheezing.    Cardiovascular:  Negative for chest pain and palpitations.   Gastrointestinal:  Negative for abdominal pain, nausea and vomiting.   Genitourinary:  Negative for dysuria and urgency.   Musculoskeletal:  Negative for arthralgias, joint swelling, myalgias and neck pain.   Skin:  Negative for rash.   Neurological:  Negative for light-headedness and headaches.   Hematological:  Negative for adenopathy.   Psychiatric/Behavioral:  The patient is not nervous/anxious.        No Known Allergies      Prior to Visit Medications    Medication Sig Taking? Authorizing Provider   ferrous sulfate (IRON 325) 325 (65 Fe) MG tablet Take 1 tablet by mouth daily (with breakfast) Yes Provider, MD Karthik   ergocalciferol (DRISDOL) 1.25 MG (12656 UT) capsule Take 1 capsule by mouth once a week for 12 days  Patient not taking: Reported on 2023  Dori Ohara MD         Past Medical History:   Diagnosis Date    Goiter     Hyperparathyroidism (HCC)        No past surgical history on file.      Family History   Problem Relation Age of Onset    Heart Attack

## 2024-03-02 NOTE — TELEPHONE ENCOUNTER
Please inform patient that her thyroid ultrasound showed 1.3 cm right thyroid nodule.  Nodule appears to be mildly suspicious, per radiology does not require biopsy or follow-up.  We can discuss more during appointment.    Bone density was normal.

## 2024-03-15 LAB
ANION GAP SERPL CALCULATED.3IONS-SCNC: 10 MMOL/L (ref 3–16)
BASOPHILS # BLD: 0 K/UL (ref 0–0.2)
BASOPHILS NFR BLD: 0.5 %
BUN SERPL-MCNC: 9 MG/DL (ref 7–20)
CALCIUM SERPL-MCNC: 11.5 MG/DL (ref 8.3–10.6)
CHLORIDE SERPL-SCNC: 101 MMOL/L (ref 99–110)
CHOLEST SERPL-MCNC: 170 MG/DL (ref 0–199)
CO2 SERPL-SCNC: 27 MMOL/L (ref 21–32)
CREAT SERPL-MCNC: 0.8 MG/DL (ref 0.6–1.1)
DEPRECATED RDW RBC AUTO: 15.2 % (ref 12.4–15.4)
EOSINOPHIL # BLD: 0 K/UL (ref 0–0.6)
EOSINOPHIL NFR BLD: 0.7 %
GFR SERPLBLD CREATININE-BSD FMLA CKD-EPI: >60 ML/MIN/{1.73_M2}
GLUCOSE SERPL-MCNC: 92 MG/DL (ref 70–99)
HCT VFR BLD AUTO: 31.2 % (ref 36–48)
HDLC SERPL-MCNC: 71 MG/DL (ref 40–60)
HGB BLD-MCNC: 10.1 G/DL (ref 12–16)
LDLC SERPL CALC-MCNC: 90 MG/DL
LYMPHOCYTES # BLD: 1.5 K/UL (ref 1–5.1)
LYMPHOCYTES NFR BLD: 29.8 %
MCH RBC QN AUTO: 25.5 PG (ref 26–34)
MCHC RBC AUTO-ENTMCNC: 32.3 G/DL (ref 31–36)
MCV RBC AUTO: 79 FL (ref 80–100)
MONOCYTES # BLD: 0.3 K/UL (ref 0–1.3)
MONOCYTES NFR BLD: 6.2 %
NEUTROPHILS # BLD: 3.2 K/UL (ref 1.7–7.7)
NEUTROPHILS NFR BLD: 62.8 %
PLATELET # BLD AUTO: 305 K/UL (ref 135–450)
PMV BLD AUTO: 9 FL (ref 5–10.5)
POTASSIUM SERPL-SCNC: 4.3 MMOL/L (ref 3.5–5.1)
RBC # BLD AUTO: 3.95 M/UL (ref 4–5.2)
SODIUM SERPL-SCNC: 138 MMOL/L (ref 136–145)
TRIGL SERPL-MCNC: 43 MG/DL (ref 0–150)
VLDLC SERPL CALC-MCNC: 9 MG/DL
WBC # BLD AUTO: 5.1 K/UL (ref 4–11)

## 2024-03-22 ENCOUNTER — TELEPHONE (OUTPATIENT)
Dept: ENDOCRINOLOGY | Age: 44
End: 2024-03-22

## 2024-03-23 NOTE — TELEPHONE ENCOUNTER
I discussed with patient that sestamibi scan localized parathyroid adenoma in right inferior gland.  Patient has appointment with Dr. Loya on Monday.  Patient has 2 appointments scheduled, May 22 and May 29.  Please cancel one of them, preferably keep May 29, but please check with patient what she would like to do based on her schedule.  If surgery is going to be scheduled around that time, both appointments can be canceled and rescheduled for later date.

## 2024-03-23 NOTE — TELEPHONE ENCOUNTER
Call patient to discuss results.    Right 1.3 cm nodule.  DEXA normal.  Parathyroid sestamibi scan localized right inferior pole parathyroid adenoma.    I wrote a referral for surgery for Dr. Tru Loya.  Ask patient if she scheduled it.    If no questions, cancel appointments.  If any questions, will keep appointment.  Has 2 appointments scheduled.

## 2024-03-25 NOTE — TELEPHONE ENCOUNTER
Spoke w/ pt. Her appt's at 2 w/ dr de leon. She cx 22nd. Will keep 5/29. Will let us know in regards to surgery.

## 2024-03-28 ENCOUNTER — TELEPHONE (OUTPATIENT)
Dept: ENDOCRINOLOGY | Age: 44
End: 2024-03-28

## 2024-05-23 DIAGNOSIS — E55.9 VITAMIN D DEFICIENCY: ICD-10-CM

## 2024-05-23 DIAGNOSIS — E04.9 GOITER: ICD-10-CM

## 2024-05-23 DIAGNOSIS — E21.0 HYPERPARATHYROIDISM, PRIMARY (HCC): ICD-10-CM

## 2024-05-23 LAB
25(OH)D3 SERPL-MCNC: 16.4 NG/ML
ALBUMIN SERPL-MCNC: 4.2 G/DL (ref 3.4–5)
ALBUMIN/GLOB SERPL: 1.2 {RATIO} (ref 1.1–2.2)
ALP SERPL-CCNC: 63 U/L (ref 40–129)
ALT SERPL-CCNC: 6 U/L (ref 10–40)
ANION GAP SERPL CALCULATED.3IONS-SCNC: 9 MMOL/L (ref 3–16)
AST SERPL-CCNC: 12 U/L (ref 15–37)
BILIRUB SERPL-MCNC: <0.2 MG/DL (ref 0–1)
BUN SERPL-MCNC: 7 MG/DL (ref 7–20)
CALCIUM SERPL-MCNC: 9.6 MG/DL (ref 8.3–10.6)
CHLORIDE SERPL-SCNC: 104 MMOL/L (ref 99–110)
CO2 SERPL-SCNC: 27 MMOL/L (ref 21–32)
CREAT SERPL-MCNC: 0.7 MG/DL (ref 0.6–1.1)
GFR SERPLBLD CREATININE-BSD FMLA CKD-EPI: >90 ML/MIN/{1.73_M2}
GLUCOSE SERPL-MCNC: 89 MG/DL (ref 70–99)
MAGNESIUM SERPL-MCNC: 2 MG/DL (ref 1.8–2.4)
PHOSPHATE SERPL-MCNC: 2.7 MG/DL (ref 2.5–4.9)
POTASSIUM SERPL-SCNC: 3.5 MMOL/L (ref 3.5–5.1)
PROT SERPL-MCNC: 7.6 G/DL (ref 6.4–8.2)
PTH-INTACT SERPL-MCNC: 54.9 PG/ML (ref 14–72)
SODIUM SERPL-SCNC: 140 MMOL/L (ref 136–145)
T4 FREE SERPL-MCNC: 1.1 NG/DL (ref 0.9–1.8)
TSH SERPL DL<=0.005 MIU/L-ACNC: 2 UIU/ML (ref 0.27–4.2)

## 2024-05-24 LAB
CA-I ADJ PH7.4 SERPL-SCNC: 1.31 MMOL/L (ref 1.09–1.3)
CA-I SERPL ISE-SCNC: 1.24 MMOL/L (ref 1.09–1.3)

## 2024-05-29 ENCOUNTER — OFFICE VISIT (OUTPATIENT)
Dept: ENDOCRINOLOGY | Age: 44
End: 2024-05-29
Payer: MEDICAID

## 2024-05-29 VITALS
TEMPERATURE: 98 F | OXYGEN SATURATION: 100 % | DIASTOLIC BLOOD PRESSURE: 93 MMHG | RESPIRATION RATE: 14 BRPM | WEIGHT: 161 LBS | BODY MASS INDEX: 26.82 KG/M2 | HEART RATE: 71 BPM | HEIGHT: 65 IN | SYSTOLIC BLOOD PRESSURE: 151 MMHG

## 2024-05-29 DIAGNOSIS — E04.1 THYROID NODULE: ICD-10-CM

## 2024-05-29 DIAGNOSIS — E04.9 GOITER: ICD-10-CM

## 2024-05-29 DIAGNOSIS — E55.9 VITAMIN D DEFICIENCY: ICD-10-CM

## 2024-05-29 DIAGNOSIS — E21.0 HYPERPARATHYROIDISM, PRIMARY (HCC): Primary | ICD-10-CM

## 2024-05-29 PROCEDURE — G8419 CALC BMI OUT NRM PARAM NOF/U: HCPCS | Performed by: INTERNAL MEDICINE

## 2024-05-29 PROCEDURE — 1036F TOBACCO NON-USER: CPT | Performed by: INTERNAL MEDICINE

## 2024-05-29 PROCEDURE — 99214 OFFICE O/P EST MOD 30 MIN: CPT | Performed by: INTERNAL MEDICINE

## 2024-05-29 PROCEDURE — G8427 DOCREV CUR MEDS BY ELIG CLIN: HCPCS | Performed by: INTERNAL MEDICINE

## 2024-05-29 RX ORDER — ERGOCALCIFEROL 1.25 MG/1
50000 CAPSULE ORAL WEEKLY
Qty: 12 CAPSULE | Refills: 1 | Status: SHIPPED | OUTPATIENT
Start: 2024-05-29

## 2024-05-29 NOTE — PROGRESS NOTES
SUBJECTIVE:  Leigh Phan is a 44 y.o. female, presenting for evaluation of the following:   Hyperparathyroidism, hypercalcemia.    Hyperparathyroidism, primary (HCC) [E21.0]    This started in 2023. Patient was diagnosed with hyperparathyroidism. The problem has been unchanged.   Patient started medication in N/A. Currently patient is on: N/A. Misses  N/A doses a month.    Current complaints: lips swollen, cheeks swollen, weight gain, chest pain, headaches, leg hurting around menstrual cycle  Gained and lost weight.  Regular periods  Has some hair on the chin, abdomen.  Fatigue varies through the day, mostly in PM  No kidney stones  No family history of calcium problem.  Trying to increase water intake.    No HTN  Has anemia  Takes iron pills.    No calcium problems or parathyroid disease in the family.  No history of MEN1 or MEN 2.     2. Vitamin D deficiency  Stopped vitamin D in 3/2023  On weekly vitamin D for 3 months.  No calcium supplement.  Eats cheese  No much milk.    3. Goiter/Thyroid nodule  Ostructive symptoms: difficulty swallowing Yes, changes in voice/hoarseness No.  History of radiation to patient's neck: No  Resent iodine exposure: No  Family history includes no thyroid abnormalities.  Family history of thyroid cancer: No    2/28/2024      HISTORY: Hyperparathyroidism, primary     COMPARISON: None     REGION ASSESSED:     L Spine (L 1-4):  BMD= 1.098 g/cm2, T-score= 0.5, Z-score= 0.0.     L Hip:  BMD= 0.851 g/cm2, T-score= -0.7, Z-score= -1.1.    L Femoral Neck:  BMD= 0.744 g/cm2, T-score= -0.9, Z-score= -1.3.     R Hip:  BMD= 0.873 g/cm2, T-score= -0.6, Z-score= -1.0  R Femoral Neck:  BMD= 0.757 g/cm2, T-score= -0.8, Z-score= -1.2.  Radius 1/3:  BMD= 0.634 g/cm2, T-score= -1.0, Z-score= -0.5     IMPRESSION:  1. The bone mineral density of the right forearm is normal.     FRAX  not reported because:  All T-scores for Spine Total, Hip Total, and Femoral Neck are at or above -1.0.

## 2024-06-10 ENCOUNTER — TELEMEDICINE (OUTPATIENT)
Dept: FAMILY MEDICINE CLINIC | Age: 44
End: 2024-06-10
Payer: MEDICAID

## 2024-06-10 DIAGNOSIS — I10 PRIMARY HYPERTENSION: Primary | ICD-10-CM

## 2024-06-10 PROCEDURE — 99213 OFFICE O/P EST LOW 20 MIN: CPT | Performed by: FAMILY MEDICINE

## 2024-06-10 PROCEDURE — G8428 CUR MEDS NOT DOCUMENT: HCPCS | Performed by: FAMILY MEDICINE

## 2024-06-10 RX ORDER — HYDROCHLOROTHIAZIDE 25 MG/1
25 TABLET ORAL DAILY
Qty: 90 TABLET | Refills: 1 | Status: SHIPPED | OUTPATIENT
Start: 2024-06-10

## 2024-06-10 ASSESSMENT — ENCOUNTER SYMPTOMS
ORTHOPNEA: 0
BLURRED VISION: 0
SHORTNESS OF BREATH: 0

## 2024-06-10 NOTE — PROGRESS NOTES
Leigh Phan, was evaluated through a synchronous (real-time) audio-video encounter. The patient (or guardian if applicable) is aware that this is a billable service, which includes applicable co-pays. This Virtual Visit was conducted with patient's (and/or legal guardian's) consent. Patient identification was verified, and a caregiver was present when appropriate.   The patient was located at Home: [address] [place] [address]  Provider was located at Facility (Appt Dept): [address] [place] [address], [address]  [place] [address]  Confirm you are appropriately licensed, registered, or certified to deliver care in the state where the patient is located as indicated above. If you are not or unsure, please re-schedule the visit: Yes, I confirm.     Leigh Phan (:  1980) is a Established patient, presenting virtually for evaluation of the following:    Assessment & Plan   Below is the assessment and plan developed based on review of pertinent history, physical exam, labs, studies, and medications.  1. Primary hypertension: new dx   At another provider's and home her bp is in the 150/90's   Will start:  -     hydroCHLOROthiazide (HYDRODIURIL) 25 MG tablet; Take 1 tablet by mouth daily, Disp-90 tablet, R-1Print    Fu in 4 weeks OV             Subjective   Hypertension  This is a new problem. The current episode started in the past 7 days. The problem is unchanged. The problem is uncontrolled (at home 150/90s). Pertinent negatives include no anxiety, blurred vision, chest pain, headaches, malaise/fatigue, neck pain, orthopnea, palpitations, peripheral edema, PND, shortness of breath or sweats. Past treatments include nothing.     Review of Systems   Constitutional:  Negative for malaise/fatigue.   Eyes:  Negative for blurred vision.   Respiratory:  Negative for shortness of breath.    Cardiovascular:  Negative for chest pain, palpitations, orthopnea and PND.   Musculoskeletal:  Negative for neck pain.

## 2024-06-12 ENCOUNTER — TELEPHONE (OUTPATIENT)
Dept: FAMILY MEDICINE CLINIC | Age: 44
End: 2024-06-12

## 2024-06-12 NOTE — TELEPHONE ENCOUNTER
MA place call to [phone] (home)  spoke with patient who had Vv appt on 6/10/2024 with Dr. Ohara. Medication that was prescribed that day was not sent to preferred pharmacy, instead it was printed off in office. Spoke with patient and she asked that I call in Rx to [facility] pharmacy located on [facility]. Rx has been called in to local [facility] pharmacy.

## 2024-06-12 NOTE — TELEPHONE ENCOUNTER
Place call to local [facility] pharmacy and left voicemail message in regards to HCTZ 25 mg tab Rx.

## 2024-07-31 ENCOUNTER — OFFICE VISIT (OUTPATIENT)
Dept: FAMILY MEDICINE CLINIC | Age: 44
End: 2024-07-31
Payer: MEDICAID

## 2024-07-31 VITALS
SYSTOLIC BLOOD PRESSURE: 118 MMHG | HEIGHT: 65 IN | TEMPERATURE: 98.8 F | DIASTOLIC BLOOD PRESSURE: 76 MMHG | BODY MASS INDEX: 25.16 KG/M2 | HEART RATE: 69 BPM | WEIGHT: 151 LBS | OXYGEN SATURATION: 100 %

## 2024-07-31 DIAGNOSIS — I10 PRIMARY HYPERTENSION: Primary | ICD-10-CM

## 2024-07-31 DIAGNOSIS — E55.9 VITAMIN D DEFICIENCY: ICD-10-CM

## 2024-07-31 PROCEDURE — 3078F DIAST BP <80 MM HG: CPT | Performed by: FAMILY MEDICINE

## 2024-07-31 PROCEDURE — 99213 OFFICE O/P EST LOW 20 MIN: CPT | Performed by: FAMILY MEDICINE

## 2024-07-31 PROCEDURE — 1036F TOBACCO NON-USER: CPT | Performed by: FAMILY MEDICINE

## 2024-07-31 PROCEDURE — 3074F SYST BP LT 130 MM HG: CPT | Performed by: FAMILY MEDICINE

## 2024-07-31 PROCEDURE — G8427 DOCREV CUR MEDS BY ELIG CLIN: HCPCS | Performed by: FAMILY MEDICINE

## 2024-07-31 PROCEDURE — G8419 CALC BMI OUT NRM PARAM NOF/U: HCPCS | Performed by: FAMILY MEDICINE

## 2024-07-31 ASSESSMENT — ENCOUNTER SYMPTOMS
SHORTNESS OF BREATH: 0
BLURRED VISION: 0
ORTHOPNEA: 0

## 2024-07-31 NOTE — PROGRESS NOTES
Leigh Phan (:  1980) is a 44 y.o. female,Established patient, here for evaluation of the following chief complaint(s):  Medication Check      Assessment & Plan      Diagnosis Orders   1. Primary hypertension   Improved  Continue HCTZ  Encourage compliance with medication, life style changes  Fu 6 mo          2. Vitamin D deficiency   Continue Drisdol  Fu with endocrinology                No follow-ups on file.       Subjective   Hypertension  This is a chronic problem. The current episode started more than 1 month ago. The problem has been gradually improving since onset. The problem is controlled. Pertinent negatives include no anxiety, blurred vision, chest pain, headaches, malaise/fatigue, neck pain, orthopnea, palpitations, peripheral edema, PND, shortness of breath or sweats. There are no associated agents to hypertension. Past treatments include diuretics. The current treatment provides significant improvement. Compliance problems include diet and exercise.  There is no history of heart failure or left ventricular hypertrophy. There is no history of pheochromocytoma.       Review of Systems   Constitutional:  Negative for malaise/fatigue.   Eyes:  Negative for blurred vision.   Respiratory:  Negative for shortness of breath.    Cardiovascular:  Negative for chest pain, palpitations, orthopnea and PND.   Musculoskeletal:  Negative for neck pain.   Neurological:  Negative for headaches.          Objective   Blood pressure 118/76, pulse 69, temperature 98.8 °F (37.1 °C), temperature source Temporal, height 1.651 m (5' 5\"), weight 68.5 kg (151 lb), SpO2 100 %, not currently breastfeeding.    Physical Exam  Vitals and nursing note reviewed.   Constitutional:       General: She is not in acute distress.     Appearance: Normal appearance. She is well-developed and normal weight. She is not ill-appearing, toxic-appearing or diaphoretic.   HENT:      Head: Normocephalic.   Eyes:      General: No scleral

## 2024-09-30 ENCOUNTER — TELEPHONE (OUTPATIENT)
Dept: ENDOCRINOLOGY | Age: 44
End: 2024-09-30

## 2024-09-30 DIAGNOSIS — E21.0 HYPERPARATHYROIDISM, PRIMARY (HCC): ICD-10-CM

## 2024-09-30 DIAGNOSIS — E55.9 VITAMIN D DEFICIENCY: ICD-10-CM

## 2024-09-30 LAB
25(OH)D3 SERPL-MCNC: 47.5 NG/ML
ALBUMIN SERPL-MCNC: 4.4 G/DL (ref 3.4–5)
ALBUMIN/GLOB SERPL: 1.2 {RATIO} (ref 1.1–2.2)
ALP SERPL-CCNC: 55 U/L (ref 40–129)
ALT SERPL-CCNC: 8 U/L (ref 10–40)
ANION GAP SERPL CALCULATED.3IONS-SCNC: 12 MMOL/L (ref 3–16)
AST SERPL-CCNC: 17 U/L (ref 15–37)
BILIRUB SERPL-MCNC: 0.4 MG/DL (ref 0–1)
BUN SERPL-MCNC: 5 MG/DL (ref 7–20)
CALCIUM SERPL-MCNC: 10.3 MG/DL (ref 8.3–10.6)
CHLORIDE SERPL-SCNC: 97 MMOL/L (ref 99–110)
CO2 SERPL-SCNC: 28 MMOL/L (ref 21–32)
CREAT SERPL-MCNC: 0.9 MG/DL (ref 0.6–1.1)
GFR SERPLBLD CREATININE-BSD FMLA CKD-EPI: 81 ML/MIN/{1.73_M2}
GLUCOSE SERPL-MCNC: 90 MG/DL (ref 70–99)
POTASSIUM SERPL-SCNC: 3.3 MMOL/L (ref 3.5–5.1)
PROT SERPL-MCNC: 8 G/DL (ref 6.4–8.2)
PTH-INTACT SERPL-MCNC: 51.1 PG/ML (ref 14–72)
SODIUM SERPL-SCNC: 137 MMOL/L (ref 136–145)
T4 FREE SERPL-MCNC: 1 NG/DL (ref 0.9–1.8)
TSH SERPL DL<=0.005 MIU/L-ACNC: 2.69 UIU/ML (ref 0.27–4.2)

## 2024-09-30 NOTE — TELEPHONE ENCOUNTER
Please inform patient that potassium came slightly low, 3.3.  Please contact PCP if she needs to add potassium supplement.  Most likely related to water pill.  Make sure follows high potassium diet.  I will discuss other results during appointment.

## 2024-10-09 ENCOUNTER — OFFICE VISIT (OUTPATIENT)
Dept: ENDOCRINOLOGY | Age: 44
End: 2024-10-09
Payer: MEDICAID

## 2024-10-09 VITALS
SYSTOLIC BLOOD PRESSURE: 137 MMHG | WEIGHT: 153 LBS | BODY MASS INDEX: 25.49 KG/M2 | HEIGHT: 65 IN | TEMPERATURE: 98 F | HEART RATE: 86 BPM | OXYGEN SATURATION: 100 % | DIASTOLIC BLOOD PRESSURE: 95 MMHG | RESPIRATION RATE: 14 BRPM

## 2024-10-09 DIAGNOSIS — E04.1 THYROID NODULE: ICD-10-CM

## 2024-10-09 DIAGNOSIS — E55.9 VITAMIN D DEFICIENCY: ICD-10-CM

## 2024-10-09 DIAGNOSIS — E21.0 HYPERPARATHYROIDISM, PRIMARY (HCC): Primary | ICD-10-CM

## 2024-10-09 PROCEDURE — 99214 OFFICE O/P EST MOD 30 MIN: CPT | Performed by: INTERNAL MEDICINE

## 2024-10-09 PROCEDURE — G8427 DOCREV CUR MEDS BY ELIG CLIN: HCPCS | Performed by: INTERNAL MEDICINE

## 2024-10-09 PROCEDURE — G8419 CALC BMI OUT NRM PARAM NOF/U: HCPCS | Performed by: INTERNAL MEDICINE

## 2024-10-09 PROCEDURE — 1036F TOBACCO NON-USER: CPT | Performed by: INTERNAL MEDICINE

## 2024-10-09 PROCEDURE — G8484 FLU IMMUNIZE NO ADMIN: HCPCS | Performed by: INTERNAL MEDICINE

## 2024-10-09 RX ORDER — ERGOCALCIFEROL 1.25 MG/1
50000 CAPSULE, LIQUID FILLED ORAL WEEKLY
Qty: 12 CAPSULE | Refills: 1 | Status: SHIPPED | OUTPATIENT
Start: 2024-10-09

## 2024-10-09 NOTE — PROGRESS NOTES
SUBJECTIVE:  Leigh Phan is a 44 y.o. female, presenting for evaluation of the following:   Hyperparathyroidism, hypercalcemia.    Hyperparathyroidism, primary (HCC) [E21.0]    This started in 2023. Patient was diagnosed with hyperparathyroidism. The problem has been unchanged.   Patient started medication in N/A. Currently patient is on: N/A. Misses  N/A doses a month.    Current complaints: lips swollen, cheeks swollen, weight gain, chest pain, headaches, leg hurting around menstrual cycle  Gained and lost weight.  Regular periods  Has some hair on the chin, abdomen.  Fatigue varies through the day, mostly in PM  No kidney stones  No family history of calcium problem.  Trying to increase water intake.    No HTN  Has anemia  Takes iron pills.    No calcium problems or parathyroid disease in the family.  No history of MEN1 or MEN 2.     2. Vitamin D deficiency  Stopped vitamin D in 3/2023  On weekly vitamin D for 3 months.  No calcium supplement.  Eats cheese  No much milk.    3. Thyroid nodule  Ostructive symptoms: difficulty swallowing Yes, changes in voice/hoarseness No.  History of radiation to patient's neck: No  Resent iodine exposure: No  Family history includes no thyroid abnormalities.  Family history of thyroid cancer: No    2/28/2024      HISTORY: Hyperparathyroidism, primary     COMPARISON: None     REGION ASSESSED:     L Spine (L 1-4):  BMD= 1.098 g/cm2, T-score= 0.5, Z-score= 0.0.     L Hip:  BMD= 0.851 g/cm2, T-score= -0.7, Z-score= -1.1.    L Femoral Neck:  BMD= 0.744 g/cm2, T-score= -0.9, Z-score= -1.3.     R Hip:  BMD= 0.873 g/cm2, T-score= -0.6, Z-score= -1.0  R Femoral Neck:  BMD= 0.757 g/cm2, T-score= -0.8, Z-score= -1.2.  Radius 1/3:  BMD= 0.634 g/cm2, T-score= -1.0, Z-score= -0.5     IMPRESSION:  1. The bone mineral density of the right forearm is normal.     FRAX  not reported because:  All T-scores for Spine Total, Hip Total, and Femoral Neck are at or above -1.0.        IMPRESSION:  1.

## 2024-10-11 ENCOUNTER — TELEPHONE (OUTPATIENT)
Dept: FAMILY MEDICINE CLINIC | Age: 44
End: 2024-10-11

## 2024-10-11 DIAGNOSIS — E87.6 HYPOKALEMIA: Primary | ICD-10-CM

## 2024-10-11 RX ORDER — POTASSIUM CHLORIDE 750 MG/1
TABLET, EXTENDED RELEASE ORAL
Qty: 30 TABLET | Refills: 1 | Status: SHIPPED | OUTPATIENT
Start: 2024-10-11

## 2024-10-11 NOTE — TELEPHONE ENCOUNTER
Patient had labs drawn by Endocrinology and was instructed to contact PCP due to potassium levels      Note to patient in chart 9/30/2024  Please contact PCP if she needs to add potassium supplement.  Most likely related to water pill.  Make sure follows high potassium diet.     Please advise   Last office visit: 7/31/2024

## 2024-10-11 NOTE — TELEPHONE ENCOUNTER
Needs to start potassium   Take one po qod   Rx sent     Re check potassium in 4 weeks  Lab order in chart

## 2024-10-11 NOTE — TELEPHONE ENCOUNTER
FLACO pedroza [facility] to [phone] (home)  spoke with patients spouse and informed him of the information stated below per Dr. Ohara. He stated understanding and will inform his wife of the information given.

## 2024-12-02 DIAGNOSIS — I10 PRIMARY HYPERTENSION: ICD-10-CM

## 2024-12-02 NOTE — TELEPHONE ENCOUNTER
Medication:   Requested Prescriptions     Pending Prescriptions Disp Refills    hydroCHLOROthiazide (HYDRODIURIL) 25 MG tablet [Pharmacy Med Name: HYDROCHLOROTHIAZIDE 25 MG TAB] 90 tablet 1     Sig: TAKE 1 TABLET BY MOUTH EVERY DAY        Last Filled:  06/10/2024     Patient Phone Number: [phone] (home)     Last appt: 7/31/2024   Next appt: Visit date not found    Last OARRS:        No data to display

## 2024-12-03 RX ORDER — HYDROCHLOROTHIAZIDE 25 MG/1
25 TABLET ORAL DAILY
Qty: 90 TABLET | Refills: 1 | Status: SHIPPED | OUTPATIENT
Start: 2024-12-03

## 2024-12-23 RX ORDER — POTASSIUM CHLORIDE 750 MG/1
TABLET, EXTENDED RELEASE ORAL
Qty: 30 TABLET | Refills: 1 | Status: SHIPPED | OUTPATIENT
Start: 2024-12-23

## 2024-12-23 NOTE — TELEPHONE ENCOUNTER
Medication:   Requested Prescriptions     Pending Prescriptions Disp Refills    potassium chloride (KLOR-CON M10) 10 MEQ extended release tablet [Pharmacy Med Name: KLOR-CON M10 TABLET] 30 tablet 1     Sig: TAKE 1 TABLET BY MOUTH EVERY OTHER DAY        Last Filled:    10/11/2024         Patient Phone Number: [phone] (home)     Last appt: 7/31/2024   Next appt: Visit date not found    Last OARRS:        No data to display                  
NO AD

## 2025-03-17 DIAGNOSIS — E55.9 VITAMIN D DEFICIENCY: ICD-10-CM

## 2025-03-17 DIAGNOSIS — I10 PRIMARY HYPERTENSION: ICD-10-CM

## 2025-03-17 RX ORDER — ERGOCALCIFEROL 1.25 MG/1
50000 CAPSULE, LIQUID FILLED ORAL WEEKLY
Qty: 12 CAPSULE | Refills: 1 | OUTPATIENT
Start: 2025-03-17

## 2025-03-18 RX ORDER — HYDROCHLOROTHIAZIDE 25 MG/1
25 TABLET ORAL DAILY
Qty: 90 TABLET | Refills: 0 | Status: SHIPPED | OUTPATIENT
Start: 2025-03-18

## 2025-03-18 NOTE — TELEPHONE ENCOUNTER
Medication:   Requested Prescriptions     Pending Prescriptions Disp Refills    hydroCHLOROthiazide (HYDRODIURIL) 25 MG tablet [Pharmacy Med Name: HYDROCHLOROTHIAZIDE 25 MG TAB] 90 tablet 1     Sig: TAKE 1 TABLET BY MOUTH EVERY DAY        Last Filled:     12/03/2024        Patient Phone Number: [phone] (home)     Last appt: 7/31/2024   Next appt: Visit date not found    Last OARRS:        No data to display

## 2025-04-04 DIAGNOSIS — E21.0 HYPERPARATHYROIDISM, PRIMARY: ICD-10-CM

## 2025-04-04 DIAGNOSIS — E55.9 VITAMIN D DEFICIENCY: ICD-10-CM

## 2025-04-04 LAB
25(OH)D3 SERPL-MCNC: 49.7 NG/ML
ALBUMIN SERPL-MCNC: 4.4 G/DL (ref 3.4–5)
ALBUMIN/GLOB SERPL: 1.2 {RATIO} (ref 1.1–2.2)
ALP SERPL-CCNC: 58 U/L (ref 40–129)
ALT SERPL-CCNC: 12 U/L (ref 10–40)
ANION GAP SERPL CALCULATED.3IONS-SCNC: 9 MMOL/L (ref 3–16)
AST SERPL-CCNC: 18 U/L (ref 15–37)
BILIRUB SERPL-MCNC: 0.3 MG/DL (ref 0–1)
BUN SERPL-MCNC: 11 MG/DL (ref 7–20)
CALCIUM SERPL-MCNC: 9.9 MG/DL (ref 8.3–10.6)
CHLORIDE SERPL-SCNC: 97 MMOL/L (ref 99–110)
CO2 SERPL-SCNC: 26 MMOL/L (ref 21–32)
CREAT SERPL-MCNC: 1.1 MG/DL (ref 0.6–1.1)
GFR SERPLBLD CREATININE-BSD FMLA CKD-EPI: 63 ML/MIN/{1.73_M2}
GLUCOSE SERPL-MCNC: 79 MG/DL (ref 70–99)
PHOSPHATE SERPL-MCNC: 2.6 MG/DL (ref 2.5–4.9)
POTASSIUM SERPL-SCNC: 3.6 MMOL/L (ref 3.5–5.1)
PROT SERPL-MCNC: 8.2 G/DL (ref 6.4–8.2)
PTH-INTACT SERPL-MCNC: 56.9 PG/ML (ref 14–72)
SODIUM SERPL-SCNC: 132 MMOL/L (ref 136–145)
T4 FREE SERPL-MCNC: 0.9 NG/DL (ref 0.9–1.8)
TSH SERPL DL<=0.005 MIU/L-ACNC: 1.31 UIU/ML (ref 0.27–4.2)

## 2025-04-09 ENCOUNTER — OFFICE VISIT (OUTPATIENT)
Dept: ENDOCRINOLOGY | Age: 45
End: 2025-04-09
Payer: MEDICAID

## 2025-04-09 VITALS
HEIGHT: 65 IN | OXYGEN SATURATION: 100 % | DIASTOLIC BLOOD PRESSURE: 83 MMHG | TEMPERATURE: 98 F | BODY MASS INDEX: 25.92 KG/M2 | RESPIRATION RATE: 14 BRPM | WEIGHT: 155.6 LBS | HEART RATE: 68 BPM | SYSTOLIC BLOOD PRESSURE: 127 MMHG

## 2025-04-09 DIAGNOSIS — E55.9 VITAMIN D DEFICIENCY: ICD-10-CM

## 2025-04-09 DIAGNOSIS — E04.1 THYROID NODULE: ICD-10-CM

## 2025-04-09 DIAGNOSIS — E21.0 HYPERPARATHYROIDISM, PRIMARY: Primary | ICD-10-CM

## 2025-04-09 PROCEDURE — G8427 DOCREV CUR MEDS BY ELIG CLIN: HCPCS | Performed by: INTERNAL MEDICINE

## 2025-04-09 PROCEDURE — G8419 CALC BMI OUT NRM PARAM NOF/U: HCPCS | Performed by: INTERNAL MEDICINE

## 2025-04-09 PROCEDURE — 1036F TOBACCO NON-USER: CPT | Performed by: INTERNAL MEDICINE

## 2025-04-09 PROCEDURE — 99214 OFFICE O/P EST MOD 30 MIN: CPT | Performed by: INTERNAL MEDICINE

## 2025-04-09 RX ORDER — ERGOCALCIFEROL 1.25 MG/1
50000 CAPSULE, LIQUID FILLED ORAL WEEKLY
Qty: 12 CAPSULE | Refills: 3 | Status: SHIPPED | OUTPATIENT
Start: 2025-04-09

## 2025-04-09 NOTE — PROGRESS NOTES
clearance 134  24-hour urine sodium 132  24-hour urine calcium 157    Regular menstrual cycles.  No history of HTN.    - Calcium Ionized Serum; Future  - PTH, Intact; Future  - Phosphorus; Future  - Magnesium; Future  - Vitamin D 25 Hydroxy; Future  - Comprehensive Metabolic Panel; Future    2. Vitamin D deficiency  Vitamin D 50,000 IU weekly  25-hydroxy vitamin D 19.7-16.4-47.5-49.7  - Vitamin D 25 Hydroxy; Future  -Comprehensive Metabolic Panel; Future    3. Thyroid nodule  2/28/2024 Thyroid US  Right 1.3 cm nodule, TR3  TSH 1.73-2.69-1.31  TPO AB, antithyroglobulin Ab normal  - T3, Free; Future  - T4, Free; Future  - TSH; Future  - US HEAD NECK SOFT TISSUE THYROID; Future      Reviewed and/or ordered clinical lab results Yes  Reviewed and/or ordered radiology tests Yes   Reviewed and/or ordered other diagnostic tests No  Discussed test results with performing physician No  Independently reviewed image, tracing, or specimen No  Made a decision to obtain old records No  Reviewed and summarized old records Yes  Hemoglobin 8.9-10.5  .9-123.4  25-hydroxy vitamin D 11.9-38.3  Calcium 10.8-11.5, ULN 10.6 calcium 2013 9.3  Obtained history from other than patient No    Leigh Phan was counseled regarding symptoms of hyperparathyroidism, vitamin D deficiency, [id number] diagnosis, course and complications of disease if inadequately treated, side effects of medications, diagnosis, treatment options, and prognosis, risks, benefits, complications, and alternatives of treatment, labs, imaging and other studies and treatment targets and goals.  She understands instructions and counseling.    Total time I spent for this encounter 30 minutes    Return in about 1 year (around 4/9/2026) for thyroid problems.    Electronically signed by Ale Trejo MD on 4/9/2025 at 11:06 AM

## 2025-08-24 DIAGNOSIS — I10 PRIMARY HYPERTENSION: ICD-10-CM

## 2025-08-25 RX ORDER — HYDROCHLOROTHIAZIDE 25 MG/1
25 TABLET ORAL DAILY
Qty: 90 TABLET | Refills: 0 | Status: SHIPPED | OUTPATIENT
Start: 2025-08-25